# Patient Record
Sex: FEMALE | Race: WHITE | NOT HISPANIC OR LATINO | Employment: OTHER | URBAN - METROPOLITAN AREA
[De-identification: names, ages, dates, MRNs, and addresses within clinical notes are randomized per-mention and may not be internally consistent; named-entity substitution may affect disease eponyms.]

---

## 2017-03-03 ENCOUNTER — HOSPITAL ENCOUNTER (EMERGENCY)
Facility: MEDICAL CENTER | Age: 62
End: 2017-03-03
Attending: EMERGENCY MEDICINE
Payer: COMMERCIAL

## 2017-03-03 ENCOUNTER — OFFICE VISIT (OUTPATIENT)
Dept: URGENT CARE | Facility: CLINIC | Age: 62
End: 2017-03-03
Payer: COMMERCIAL

## 2017-03-03 ENCOUNTER — APPOINTMENT (OUTPATIENT)
Dept: RADIOLOGY | Facility: MEDICAL CENTER | Age: 62
End: 2017-03-03
Attending: EMERGENCY MEDICINE
Payer: COMMERCIAL

## 2017-03-03 VITALS
OXYGEN SATURATION: 94 % | RESPIRATION RATE: 10 BRPM | HEIGHT: 62 IN | DIASTOLIC BLOOD PRESSURE: 90 MMHG | HEART RATE: 94 BPM | SYSTOLIC BLOOD PRESSURE: 162 MMHG | TEMPERATURE: 98.6 F | WEIGHT: 144 LBS | BODY MASS INDEX: 26.5 KG/M2

## 2017-03-03 VITALS
TEMPERATURE: 98.2 F | HEART RATE: 70 BPM | WEIGHT: 143 LBS | SYSTOLIC BLOOD PRESSURE: 112 MMHG | DIASTOLIC BLOOD PRESSURE: 62 MMHG | OXYGEN SATURATION: 99 % | RESPIRATION RATE: 16 BRPM | BODY MASS INDEX: 26.15 KG/M2

## 2017-03-03 DIAGNOSIS — R07.89 CHEST TIGHTNESS: ICD-10-CM

## 2017-03-03 DIAGNOSIS — R06.09 DYSPNEA ON EFFORT: ICD-10-CM

## 2017-03-03 DIAGNOSIS — B33.24 VIRAL CARDIOMYOPATHY (HCC): ICD-10-CM

## 2017-03-03 DIAGNOSIS — J01.00 ACUTE MAXILLARY SINUSITIS, RECURRENCE NOT SPECIFIED: ICD-10-CM

## 2017-03-03 DIAGNOSIS — J40 BRONCHITIS: ICD-10-CM

## 2017-03-03 DIAGNOSIS — J06.9 VIRAL UPPER RESPIRATORY ILLNESS: ICD-10-CM

## 2017-03-03 LAB
ALBUMIN SERPL BCP-MCNC: 4.1 G/DL (ref 3.2–4.9)
ALBUMIN/GLOB SERPL: 1.1 G/DL
ALP SERPL-CCNC: 131 U/L (ref 30–99)
ALT SERPL-CCNC: 26 U/L (ref 2–50)
ANION GAP SERPL CALC-SCNC: 10 MMOL/L (ref 0–11.9)
AST SERPL-CCNC: 31 U/L (ref 12–45)
BASOPHILS # BLD AUTO: 0.7 % (ref 0–1.8)
BASOPHILS # BLD: 0.05 K/UL (ref 0–0.12)
BILIRUB SERPL-MCNC: 0.3 MG/DL (ref 0.1–1.5)
BNP SERPL-MCNC: 5 PG/ML (ref 0–100)
BUN SERPL-MCNC: 10 MG/DL (ref 8–22)
CALCIUM SERPL-MCNC: 8.6 MG/DL (ref 8.5–10.5)
CHLORIDE SERPL-SCNC: 100 MMOL/L (ref 96–112)
CO2 SERPL-SCNC: 22 MMOL/L (ref 20–33)
CREAT SERPL-MCNC: 0.99 MG/DL (ref 0.5–1.4)
EOSINOPHIL # BLD AUTO: 0.23 K/UL (ref 0–0.51)
EOSINOPHIL NFR BLD: 3.2 % (ref 0–6.9)
ERYTHROCYTE [DISTWIDTH] IN BLOOD BY AUTOMATED COUNT: 41.1 FL (ref 35.9–50)
GFR SERPL CREATININE-BSD FRML MDRD: 57 ML/MIN/1.73 M 2
GLOBULIN SER CALC-MCNC: 3.9 G/DL (ref 1.9–3.5)
GLUCOSE SERPL-MCNC: 98 MG/DL (ref 65–99)
HCT VFR BLD AUTO: 42.5 % (ref 37–47)
HGB BLD-MCNC: 14.3 G/DL (ref 12–16)
IMM GRANULOCYTES # BLD AUTO: 0.01 K/UL (ref 0–0.11)
IMM GRANULOCYTES NFR BLD AUTO: 0.1 % (ref 0–0.9)
LYMPHOCYTES # BLD AUTO: 1.4 K/UL (ref 1–4.8)
LYMPHOCYTES NFR BLD: 19.3 % (ref 22–41)
MCH RBC QN AUTO: 29.1 PG (ref 27–33)
MCHC RBC AUTO-ENTMCNC: 33.6 G/DL (ref 33.6–35)
MCV RBC AUTO: 86.4 FL (ref 81.4–97.8)
MONOCYTES # BLD AUTO: 0.77 K/UL (ref 0–0.85)
MONOCYTES NFR BLD AUTO: 10.6 % (ref 0–13.4)
NEUTROPHILS # BLD AUTO: 4.81 K/UL (ref 2–7.15)
NEUTROPHILS NFR BLD: 66.1 % (ref 44–72)
NRBC # BLD AUTO: 0 K/UL
NRBC BLD AUTO-RTO: 0 /100 WBC
PLATELET # BLD AUTO: 252 K/UL (ref 164–446)
PMV BLD AUTO: 10.9 FL (ref 9–12.9)
POTASSIUM SERPL-SCNC: 3.8 MMOL/L (ref 3.6–5.5)
PROT SERPL-MCNC: 8 G/DL (ref 6–8.2)
RBC # BLD AUTO: 4.92 M/UL (ref 4.2–5.4)
SODIUM SERPL-SCNC: 132 MMOL/L (ref 135–145)
TROPONIN I SERPL-MCNC: <0.01 NG/ML (ref 0–0.04)
WBC # BLD AUTO: 7.3 K/UL (ref 4.8–10.8)

## 2017-03-03 PROCEDURE — 36415 COLL VENOUS BLD VENIPUNCTURE: CPT

## 2017-03-03 PROCEDURE — 71010 DX-CHEST-LIMITED (1 VIEW): CPT

## 2017-03-03 PROCEDURE — 99204 OFFICE O/P NEW MOD 45 MIN: CPT | Performed by: FAMILY MEDICINE

## 2017-03-03 PROCEDURE — 87040 BLOOD CULTURE FOR BACTERIA: CPT

## 2017-03-03 PROCEDURE — 84484 ASSAY OF TROPONIN QUANT: CPT

## 2017-03-03 PROCEDURE — 93000 ELECTROCARDIOGRAM COMPLETE: CPT | Performed by: FAMILY MEDICINE

## 2017-03-03 PROCEDURE — 83880 ASSAY OF NATRIURETIC PEPTIDE: CPT

## 2017-03-03 PROCEDURE — 85025 COMPLETE CBC W/AUTO DIFF WBC: CPT

## 2017-03-03 PROCEDURE — 99284 EMERGENCY DEPT VISIT MOD MDM: CPT

## 2017-03-03 PROCEDURE — 80053 COMPREHEN METABOLIC PANEL: CPT

## 2017-03-03 RX ORDER — TRIAMCINOLONE ACETONIDE 55 UG/1
2 SPRAY, METERED NASAL DAILY
Qty: 1 BOTTLE | Refills: 1 | Status: SHIPPED | OUTPATIENT
Start: 2017-03-03 | End: 2020-12-24

## 2017-03-03 RX ORDER — AMOXICILLIN 875 MG/1
875 TABLET, COATED ORAL 2 TIMES DAILY
Qty: 28 TAB | Refills: 0 | Status: SHIPPED | OUTPATIENT
Start: 2017-03-03 | End: 2017-03-17

## 2017-03-03 RX ORDER — IBUPROFEN 200 MG
3 CAPSULE ORAL EVERY EVENING
COMMUNITY
End: 2020-12-24

## 2017-03-03 RX ORDER — ASPIRIN 81 MG/1
162 TABLET, CHEWABLE ORAL ONCE
Status: COMPLETED | OUTPATIENT
Start: 2017-03-03 | End: 2017-03-03

## 2017-03-03 RX ORDER — BENZONATATE 200 MG/1
200 CAPSULE ORAL 3 TIMES DAILY PRN
Qty: 30 CAP | Refills: 1 | Status: SHIPPED | OUTPATIENT
Start: 2017-03-03 | End: 2020-12-24

## 2017-03-03 RX ORDER — ALBUTEROL SULFATE 90 UG/1
2 AEROSOL, METERED RESPIRATORY (INHALATION)
Qty: 1 INHALER | Refills: 2 | Status: SHIPPED | OUTPATIENT
Start: 2017-03-03 | End: 2023-09-24

## 2017-03-03 RX ADMIN — ASPIRIN 162 MG: 81 TABLET, CHEWABLE ORAL at 15:04

## 2017-03-03 ASSESSMENT — ENCOUNTER SYMPTOMS
VOMITING: 0
FEVER: 1
SPUTUM PRODUCTION: 1
CHILLS: 1
FALLS: 0
WHEEZING: 0
HEMOPTYSIS: 0
SORE THROAT: 1
BLURRED VISION: 0
PHOTOPHOBIA: 1
MYALGIAS: 1
NAUSEA: 0
NAUSEA: 0
RHINORRHEA: 1
BACK PAIN: 0
COUGH: 1
FEVER: 0
SHORTNESS OF BREATH: 1
DIZZINESS: 0
CHILLS: 1
VOMITING: 0
SHORTNESS OF BREATH: 1
NECK PAIN: 0
COUGH: 1

## 2017-03-03 ASSESSMENT — PAIN SCALES - GENERAL: PAINLEVEL_OUTOF10: 4

## 2017-03-03 NOTE — PROGRESS NOTES
Subjective:      Lisa Yang is a 61 y.o. female who presents with Cough and Rash            Cough  This is a new problem. The current episode started in the past 7 days (5 days). The problem has been gradually worsening. The problem occurs constantly. The cough is productive of sputum (yellow). Associated symptoms include chest pain, chills, ear congestion, a fever, myalgias, nasal congestion, postnasal drip, a rash, rhinorrhea, a sore throat and shortness of breath. Pertinent negatives include no ear pain, hemoptysis or wheezing. Associated symptoms comments: Tactile fever. Exacerbated by: deep breathingdaytime and hot showers. Treatments tried: theraflu, nyquil. The treatment provided mild relief. Her past medical history is significant for asthma, bronchitis, environmental allergies and pneumonia.   Rash  Associated symptoms include coughing, a fever, rhinorrhea, shortness of breath and a sore throat. Pertinent negatives include no vomiting. Her past medical history is significant for asthma.       Review of Systems   Constitutional: Positive for fever and chills.   HENT: Positive for postnasal drip, rhinorrhea and sore throat. Negative for ear pain.    Eyes: Negative for blurred vision.   Respiratory: Positive for cough and shortness of breath. Negative for hemoptysis and wheezing.    Cardiovascular: Positive for chest pain.   Gastrointestinal: Negative for nausea and vomiting.   Musculoskeletal: Positive for myalgias.   Skin: Positive for rash.   Neurological: Negative for dizziness.   Endo/Heme/Allergies: Positive for environmental allergies.     PMH:  has a past medical history of Papillary carcinoma of thyroid (CMS-HCC) (1998); Hypothyroidism, postsurgical (1998); papillary carcinoma thyroid (Mar 1998); papillary carcinoma thyroid (Nov 1998); papillary carcinoma thyroid (1999); papillary carcinoma thyroid (2003); papillary carcinoma thyroid (2005); S/P hysterectomy with oophorectomy; and S/P  cholecystectomy (2006).  MEDS:   Current outpatient prescriptions:   •  ARMOUR THYROID 30 MG Tab, Take 4 tablets every 48  hours. Then take 3 tablets  every 48 hours., Disp: 360 Tab, Rfl: 3  •  calcitRIOL (ROCALTROL) 0.25 MCG Cap, Take 2 Caps by mouth every day., Disp: 180 Cap, Rfl: 3  •  estradiol (VIVELLE DOT) 0.05 MG/24HR PATCH BIWEEKLY, Apply 1 Patch to skin as directed every 7 days., Disp: , Rfl:   ALLERGIES:   Allergies   Allergen Reactions   • Codeine      SURGHX:   Past Surgical History   Procedure Laterality Date   • Thyroidectomy     • Appendectomy     • Tubal coagulation laparoscopic bilateral     • Abdominal hysterectomy total     • Cholecystectomy       SOCHX:  reports that she has never smoked. She has never used smokeless tobacco. She reports that she drinks alcohol. She reports that she does not use illicit drugs.  FH: Family history was reviewed, no pertinent findings to report      Objective:     /62 mmHg  Pulse 70  Temp(Src) 36.8 °C (98.2 °F)  Resp 16  Wt 64.864 kg (143 lb)  SpO2 99%     Physical Exam   Constitutional: She appears well-developed.   HENT:   Head: Normocephalic.   Right Ear: External ear normal.   Left Ear: External ear normal.   Mouth/Throat: Oropharyngeal exudate present.   Nasal congestion   Eyes: Pupils are equal, round, and reactive to light. Right eye exhibits no discharge. Left eye exhibits no discharge.   Neck: Neck supple. No thyromegaly present.   Cardiovascular: Normal rate.  Exam reveals no friction rub.    No murmur heard.  Pulmonary/Chest: Effort normal. No respiratory distress. She has no wheezes.   Abdominal: Soft. She exhibits no distension. There is no tenderness. There is no guarding.   Lymphadenopathy:     She has no cervical adenopathy.   Neurological: She is alert.   Skin: Skin is warm and dry. No erythema.   Psychiatric: She has a normal mood and affect. Her behavior is normal.               Assessment/Plan:     1. Dyspnea on effort  EKG   2. Viral  cardiomyopathy (CMS-HCC)     3. Viral upper respiratory illness     4. Chest tightness  aspirin (ASA) chewable tab 162 mg     Patient has viral URI symptoms  The concern is that she has had some shortness of breath and chest tightness  She has a history of asthma, but she is stating that her symptoms don't seem asthmatic in nature and her lungs sound clear  She was having dyspnea on exertion with routine activity  Recommended transfer to the emergency room  Originally planned on going by private vehicle to [facility], but upon further discussion she agreed to go by ambulance to [facility]    2 baby aspirin provided in the office  [facility] called for transport Called [facility] and spoke with on call MD for report, accepted transfer

## 2017-03-03 NOTE — ED NOTES
Pt arrived via ems from , per ems pt started  Noticed a rash on chest 2 weeks ago along with a sinus infection. Sinus infection went away after 2 days but started having chest congestion, coughing, body aches for 5 days. U/a pt sitting up caox4 speaking in full sentences no distress, maintaining patent airway. +sob, +chest pain with inspiration, no abd pain.

## 2017-03-03 NOTE — MR AVS SNAPSHOT
Lisa Yang   3/3/2017 1:45 PM   Office Visit   MRN: [id number]    Department:  [facility]   Dept Phone:  [phone]    Description:  Female : 1955   Provider:  Robert Patten M.D.           Reason for Visit     Cough productive cough, yellow phlem, congestion x 5 days    Rash red raised rash on chest x 2 weeks      Allergies as of 3/3/2017     Allergen Noted Reactions    Codeine 2010         You were diagnosed with     Dyspnea on effort   [142978]       Viral cardiomyopathy (CMS-HCC)   [614306]       Viral upper respiratory illness   [982728]       Chest tightness   [579554]         Vital Signs     Blood Pressure Pulse Temperature Respirations Weight Oxygen Saturation    112/62 mmHg 70 36.8 °C (98.2 °F) 16 64.864 kg (143 lb) 99%    Smoking Status                   Never Smoker            Basic Information     Date Of Birth Sex Race Ethnicity Preferred Language    1955 Female White Non- English      Problem List              ICD-10-CM Priority Class Noted - Resolved    Papillary carcinoma of thyroid (CMS-HCC) C73   2010 - Present    Hypocalcemia E83.51   Unknown - Present    Hypothyroidism, postsurgical E89.0   2015 - Present      Health Maintenance        Date Due Completion Dates    IMM DTaP/Tdap/Td Vaccine (1 - Tdap) 1975 ---    PAP SMEAR 1977 ---    MAMMOGRAM 1996 ---    COLONOSCOPY 2006 ---    IMM INFLUENZA (1) 2016 ---    IMM ZOSTER VACCINE 2016 ---            Current Immunizations     No immunizations on file.      Below and/or attached are the medications your provider expects you to take. Review all of your home medications and newly ordered medications with your provider and/or pharmacist. Follow medication instructions as directed by your provider and/or pharmacist. Please keep your medication list with you and share with your provider. Update the information when medications are discontinued, doses are changed, or new  medications (including over-the-counter products) are added; and carry medication information at all times in the event of emergency situations     Allergies:  CODEINE - (reactions not documented)               Medications  Valid as of: March 03, 2017 -  3:05 PM    Generic Name Brand Name Tablet Size Instructions for use    Calcitriol (Cap) ROCALTROL 0.25 MCG Take 2 Caps by mouth every day.        Estradiol (PATCH BIWEEKLY) VIVELLE DOT 0.05 MG/24HR Apply 1 Patch to skin as directed every 7 days.        Thyroid (Tab) ARMOUR THYROID 30 MG Take 4 tablets every 48  hours. Then take 3 tablets  every 48 hours.        .                 Medicines prescribed today were sent to:     [facility][id number] - [facility] [address] [address]    Phone: [phone] Fax: [phone]    Open 24 Hours?: No    [facility] - [address] [facility] [address]    Phone: [phone] Fax: [phone]    Open 24 Hours?: No    [facility] #[id number] - [place], [place] - [address]    [address] [facility] [address]    Phone: [phone] Fax: [phone]    Open 24 Hours?: No      Medication refill instructions:       If your prescription bottle indicates you have medication refills left, it is not necessary to call your provider’s office. Please contact your pharmacy and they will refill your medication.    If your prescription bottle indicates you do not have any refills left, you may request refills at any time through one of the following ways: The online iWitness system (except Urgent Care), by calling your provider’s office, or by asking your pharmacy to contact your provider’s office with a refill request. Medication refills are processed only during regular business hours and may not be available until the next business day. Your provider may request additional information or to have a follow-up visit with you prior to  refilling your medication.   *Please Note: Medication refills are assigned a new Rx number when refilled electronically. Your pharmacy may indicate that no refills were authorized even though a new prescription for the same medication is available at the pharmacy. Please request the medicine by name with the pharmacy before contacting your provider for a refill.           Order Mapper Access Code: [email or web address]  Expires: 4/2/2017  3:05 PM    Order Mapper  A secure, online tool to manage your health information     [employer]’s Order Mapper® is a secure, online tool that connects you to your personalized health information from the privacy of your home -- day or night - making it very easy for you to manage your healthcare. Once the activation process is completed, you can even access your medical information using the Order Mapper teddy, which is available for free in the Apple Teddy store or Google Play store.     Order Mapper provides the following levels of access (as shown below):   My Chart Features   [facility] Doctor [facility]  Specialists [facility]  Urgent  Care Non-[facility]  Primary Care  Doctor   Email your healthcare team securely and privately 24/7 X X X    Manage appointments: schedule your next appointment; view details of past/upcoming appointments X      Request prescription refills. X      View recent personal medical records, including lab and immunizations X X X X   View health record, including health history, allergies, medications X X X X   Read reports about your outpatient visits, procedures, consult and ER notes X X X X   See your discharge summary, which is a recap of your hospital and/or ER visit that includes your diagnosis, lab results, and care plan. X X       How to register for Order Mapper:  1. Go to  [email or web address].  2. Click on the Sign Up Now box, which takes you to the New Member Sign Up page. You will need to provide the following information:  a. Enter your Order Mapper Access Code exactly as it  appears at the top of this page. (You will not need to use this code after you’ve completed the sign-up process. If you do not sign up before the expiration date, you must request a new code.)   b. Enter your date of birth.   c. Enter your home email address.   d. Click Submit, and follow the next screen’s instructions.  3. Create a [employer] ID. This will be your [employer] login ID and cannot be changed, so think of one that is secure and easy to remember.  4. Create a [employer] password. You can change your password at any time.  5. Enter your Password Reset Question and Answer. This can be used at a later time if you forget your password.   6. Enter your e-mail address. This allows you to receive e-mail notifications when new information is available in [employer].  7. Click Sign Up. You can now view your health information.    For assistance activating your [employer] account, call [phone]

## 2017-03-03 NOTE — ED AVS SNAPSHOT
3/3/2017          Lisa Yang  [address] [facility] [place] [address]    Dear Lisa:    [facility] wants to ensure your discharge home is safe and you or your loved ones have had all your questions answered regarding your care after you leave the hospital.    You may receive a telephone call within two days of your discharge.  This call is to make certain you understand your discharge instructions as well as ensure we provided you with the best care possible during your stay with us.     The call will only last approximately 3-5 minutes and will be done by a nurse.    Once again, we want to ensure your discharge home is safe and that you have a clear understanding of any next steps in your care.  If you have any questions or concerns, please do not hesitate to contact us, we are here for you.  Thank you for choosing [facility] for your healthcare needs.    Sincerely,    Navid Walker    [facility]

## 2017-03-03 NOTE — ED AVS SNAPSHOT
Cavis microcaps Access Code: [email or web address]  Expires: 4/2/2017  3:05 PM    Your email address is not on file at [facility].  Email Addresses are required for you to sign up for Cavis microcaps, please contact [phone] to verify your personal information and to provide your email address prior to attempting to register for Cavis microcaps.    Lisa Yang  [address] [place] [address]    Cavis microcaps  A secure, online tool to manage your health information     [employer]’s Cavis microcaps® is a secure, online tool that connects you to your personalized health information from the privacy of your home -- day or night - making it very easy for you to manage your healthcare. Once the activation process is completed, you can even access your medical information using the Cavis microcaps teddy, which is available for free in the Apple Teddy store or Google Play store.     To learn more about Cavis microcaps, visit [email or web address]    There are two levels of access available (as shown below):   My Chart Features  [facility] Primary Care Doctor [facility]  Specialists [facility]  Urgent  Care Non-[facility] Primary Care Doctor   Email your healthcare team securely and privately 24/7 X X X    Manage appointments: schedule your next appointment; view details of past/upcoming appointments X      Request prescription refills. X      View recent personal medical records, including lab and immunizations X X X X   View health record, including health history, allergies, medications X X X X   Read reports about your outpatient visits, procedures, consult and ER notes X X X X   See your discharge summary, which is a recap of your hospital and/or ER visit that includes your diagnosis, lab results, and care plan X X  X     How to register for Cavis microcaps:  Once your e-mail address has been verified, follow the following steps to sign up for Cavis microcaps.     1. Go to  [email or web address]  2. Click on the Sign Up Now box, which takes you to the New Member Sign Up page. You will  need to provide the following information:  a. Enter your C[employer]ay Access Code exactly as it appears at the top of this page. (You will not need to use this code after you’ve completed the sign-up process. If you do not sign up before the expiration date, you must request a new code.)   b. Enter your date of birth.   c. Enter your home email address.   d. Click Submit, and follow the next screen’s instructions.  3. Create a [employer] ID. This will be your Cinsay login ID and cannot be changed, so think of one that is secure and easy to remember.  4. Create a Cinsay password. You can change your password at any time.  5. Enter your Password Reset Question and Answer. This can be used at a later time if you forget your password.   6. Enter your e-mail address. This allows you to receive e-mail notifications when new information is available in Cinsay.  7. Click Sign Up. You can now view your health information.    For assistance activating your C[employer] account, call [phone]

## 2017-03-03 NOTE — ED AVS SNAPSHOT
Home Care Instructions                                                                                                                Lisa Yang   MRN: [id number]    Department:  [facility], Emergency Dept   Date of Visit:  3/3/2017            [facility], Emergency Dept    [address]    [place] [address]    Phone:  [phone]      You were seen by     Miguel Ivan M.D.      Your Diagnosis Was     Acute maxillary sinusitis, recurrence not specified     J01.00       Medication Information     Review all of your home medications and newly ordered medications with your primary doctor and/or pharmacist as soon as possible. Follow medication instructions as directed by your doctor and/or pharmacist.     Please keep your complete medication list with you and share with your physician. Update the information when medications are discontinued, doses are changed, or new medications (including over-the-counter products) are added; and carry medication information at all times in the event of emergency situations.               Medication List      START taking these medications        Instructions    albuterol 108 (90 BASE) MCG/ACT Aers inhalation aerosol    Inhale 2 Puffs by mouth every 2 hours as needed for Shortness of Breath (or cough).   Dose:  2 Puff       amoxicillin 875 MG tablet   Commonly known as:  AMOXIL    Take 1 Tab by mouth 2 times a day for 14 days.   Dose:  875 mg       benzonatate 200 MG capsule   Commonly known as:  TESSALON    Take 1 Cap by mouth 3 times a day as needed for Cough.   Dose:  200 mg       triamcinolone 55 MCG/ACT nasal inhaler   Commonly known as:  NASACORT    Spray 2 Sprays in nose every day.   Dose:  2 Spray         ASK your doctor about these medications        Instructions    ARMOUR THYROID 30 MG Tabs   Generic drug:  thyroid    Take 4 tablets every 48  hours. Then take 3 tablets  every 48 hours.       calcitRIOL 0.25 MCG Caps      Commonly known as:  ROCALTROL    Take 2 Caps by mouth every day.   Dose:  0.5 mcg       Calcium 600 MG Tabs    Take 3 Tabs by mouth every evening.   Dose:  3 Tab       estradiol 0.05 MG/24HR Pttw   Commonly known as:  CHELE CALDWELL    Apply 1 Patch to skin as directed 2X A WEEK. Tuesday/Saturday   Dose:  1 Patch               Procedures and tests performed during your visit     Procedure/Test Number of Times Performed    BLOOD CULTURE 2    BTYPE NATRIURETIC PEPTIDE 1    CARDIAC MONITORING 1    CBC WITH DIFFERENTIAL 1    COMP METABOLIC PANEL 1    DX-CHEST-LIMITED (1 VIEW) 1    ESTIMATED GFR 1    O2 Protocol 1    SALINE LOCK 1    TROPONIN 1        Discharge Instructions       Bronchitis    Return to the ED with any new or worsening symptoms. Follow up with Cardiology.  Please follow up with a primary physician for blood pressure management, diabetic screening, and all other preventive health concerns.   Bronchitis is the body's way of reacting to injury and/or infection (inflammation) of the bronchi. Bronchi are the air tubes that extend from the windpipe into the lungs. If the inflammation becomes severe, it may cause shortness of breath.  CAUSES   Inflammation may be caused by:  · A virus.  · Germs (bacteria).  · Dust.  · Allergens.  · Pollutants and many other irritants.  The cells lining the bronchial tree are covered with tiny hairs (cilia). These constantly beat upward, away from the lungs, toward the mouth. This keeps the lungs free of pollutants. When these cells become too irritated and are unable to do their job, mucus begins to develop. This causes the characteristic cough of bronchitis. The cough clears the lungs when the cilia are unable to do their job. Without either of these protective mechanisms, the mucus would settle in the lungs. Then you would develop pneumonia.  Smoking is a common cause of bronchitis and can contribute to pneumonia. Stopping this habit is the single most important thing you can  do to help yourself.  TREATMENT   · Your caregiver may prescribe an antibiotic if the cough is caused by bacteria. Also, medicines that open up your airways make it easier to breathe. Your caregiver may also recommend or prescribe an expectorant. It will loosen the mucus to be coughed up. Only take over-the-counter or prescription medicines for pain, discomfort, or fever as directed by your caregiver.  · Removing whatever causes the problem (smoking, for example) is critical to preventing the problem from getting worse.  · Cough suppressants may be prescribed for relief of cough symptoms.  · Inhaled medicines may be prescribed to help with symptoms now and to help prevent problems from returning.  · For those with recurrent (chronic) bronchitis, there may be a need for steroid medicines.  SEEK IMMEDIATE MEDICAL CARE IF:   · During treatment, you develop more pus-like mucus (purulent sputum).  · You have a fever.  · Your baby is older than 3 months with a rectal temperature of 102° F (38.9° C) or higher.  · Your baby is 3 months old or younger with a rectal temperature of 100.4° F (38° C) or higher.  · You become progressively more ill.  · You have increased difficulty breathing, wheezing, or shortness of breath.  It is necessary to seek immediate medical care if you are elderly or sick from any other disease.  MAKE SURE YOU:   · Understand these instructions.  · Will watch your condition.  · Will get help right away if you are not doing well or get worse.  Document Released: 12/18/2006 Document Revised: 03/11/2013 Document Reviewed: 10/27/2009  ExitCare® Patient Information ©2014 [employer].      Sinusitis, Adult  Sinusitis is redness, soreness, and inflammation of the paranasal sinuses. Paranasal sinuses are air pockets within the bones of your face. They are located beneath your eyes, in the middle of your forehead, and above your eyes. In healthy paranasal sinuses, mucus is able to drain out, and air is able  to circulate through them by way of your nose. However, when your paranasal sinuses are inflamed, mucus and air can become trapped. This can allow bacteria and other germs to grow and cause infection.  Sinusitis can develop quickly and last only a short time (acute) or continue over a long period (chronic). Sinusitis that lasts for more than 12 weeks is considered chronic.  CAUSES  Causes of sinusitis include:  · Allergies.  · Structural abnormalities, such as displacement of the cartilage that separates your nostrils (deviated septum), which can decrease the air flow through your nose and sinuses and affect sinus drainage.  · Functional abnormalities, such as when the small hairs (cilia) that line your sinuses and help remove mucus do not work properly or are not present.  SIGNS AND SYMPTOMS  Symptoms of acute and chronic sinusitis are the same. The primary symptoms are pain and pressure around the affected sinuses. Other symptoms include:  · Upper toothache.  · Earache.  · Headache.  · Bad breath.  · Decreased sense of smell and taste.  · A cough, which worsens when you are lying flat.  · Fatigue.  · Fever.  · Thick drainage from your nose, which often is green and may contain pus (purulent).  · Swelling and warmth over the affected sinuses.  DIAGNOSIS  Your health care provider will perform a physical exam. During your exam, your health care provider may perform any of the following to help determine if you have acute sinusitis or chronic sinusitis:  · Look in your nose for signs of abnormal growths in your nostrils (nasal polyps).  · Tap over the affected sinus to check for signs of infection.  · View the inside of your sinuses using an imaging device that has a light attached (endoscope).  If your health care provider suspects that you have chronic sinusitis, one or more of the following tests may be recommended:  · Allergy tests.  · Nasal culture. A sample of mucus is taken from your nose, sent to a lab, and  screened for bacteria.  · Nasal cytology. A sample of mucus is taken from your nose and examined by your health care provider to determine if your sinusitis is related to an allergy.  TREATMENT  Most cases of acute sinusitis are related to a viral infection and will resolve on their own within 10 days. Sometimes, medicines are prescribed to help relieve symptoms of both acute and chronic sinusitis. These may include pain medicines, decongestants, nasal steroid sprays, or saline sprays.  However, for sinusitis related to a bacterial infection, your health care provider will prescribe antibiotic medicines. These are medicines that will help kill the bacteria causing the infection.  Rarely, sinusitis is caused by a fungal infection. In these cases, your health care provider will prescribe antifungal medicine.  For some cases of chronic sinusitis, surgery is needed. Generally, these are cases in which sinusitis recurs more than 3 times per year, despite other treatments.  HOME CARE INSTRUCTIONS  · Drink plenty of water. Water helps thin the mucus so your sinuses can drain more easily.  · Use a humidifier.  · Inhale steam 3-4 times a day (for example, sit in the bathroom with the shower running).  · Apply a warm, moist washcloth to your face 3-4 times a day, or as directed by your health care provider.  · Use saline nasal sprays to help moisten and clean your sinuses.  · Take medicines only as directed by your health care provider.  · If you were prescribed either an antibiotic or antifungal medicine, finish it all even if you start to feel better.  SEEK IMMEDIATE MEDICAL CARE IF:  · You have increasing pain or severe headaches.  · You have nausea, vomiting, or drowsiness.  · You have swelling around your face.  · You have vision problems.  · You have a stiff neck.  · You have difficulty breathing.     This information is not intended to replace advice given to you by your health care provider. Make sure you discuss any  questions you have with your health care provider.     Document Released: 12/18/2006 Document Revised: 01/08/2016 Document Reviewed: 01/01/2013  Elsevier Interactive Patient Education ©2016 [employer]            Patient Information     Patient Information    Following emergency treatment: all patient requiring follow-up care must return either to a private physician or a clinic if your condition worsens before you are able to obtain further medical attention, please return to the emergency room.     Billing Information    At [facility], we work to make the billing process streamlined for our patients.  Our Representatives are here to answer any questions you may have regarding your hospital bill.  If you have insurance coverage and have supplied your insurance information to us, we will submit a claim to your insurer on your behalf.  Should you have any questions regarding your bill, we can be reached online or by phone as follows:  Online: You are able pay your bills online or live chat with our representatives about any billing questions you may have. We are here to help Monday - Friday from 8:00am to 7:30pm and 9:00am - 12:00pm on Saturdays.  Please visit [email or web address]  for more information.   Phone:  [phone] or [phone]    Please note that your emergency physician, surgeon, pathologist, radiologist, anesthesiologist, and other specialists are not employed by [facility] and will therefore bill separately for their services.  Please contact them directly for any questions concerning their bills at the numbers below:     Emergency Physician Services:  [phone]  [facility]:  [phone]  [facility]:  [phone]  [facility]:  [phone]    1. Your final bill may vary from the amount quoted upon discharge if all procedures are not complete at that time, or if your doctor has additional procedures of which  we are not aware. You will receive an additional bill if you return to the Emergency Department at [facility] for suture removal regardless of the facility of which the sutures were placed.     2. Please arrange for settlement of this account at the emergency registration.    3. All self-pay accounts are due in full at the time of treatment.  If you are unable to meet this obligation then payment is expected within 4-5 days.     4. If you have had radiology studies (CT, X-ray, Ultrasound, MRI), you have received a preliminary result during your emergency department visit. Please contact the radiology department [phone] to receive a copy of your final result. Please discuss the Final result with your primary physician or with the follow up physician provided.     Crisis Hotline:  [place] Crisis Hotline:  2-741-BKSOLYL or 1-985.638.2438  [place] Crisis Hotline:    1-865.965.2055 or 584-878-9295         ED Discharge Follow Up Questions    1. In order to provide you with very good care, we would like to follow up with a phone call in the next few days.  May we have your permission to contact you?     YES /  NO    2. What is the best phone number to call you? (       )_____-__________    3. What is the best time to call you?      Morning  /  Afternoon  /  Evening                   Patient Signature:  ____________________________________________________________    Date:  ____________________________________________________________

## 2017-03-04 ENCOUNTER — PATIENT OUTREACH (OUTPATIENT)
Dept: HEALTH INFORMATION MANAGEMENT | Facility: OTHER | Age: 62
End: 2017-03-04

## 2017-03-04 NOTE — DISCHARGE INSTRUCTIONS
Bronchitis    Return to the ED with any new or worsening symptoms. Follow up with Cardiology.  Please follow up with a primary physician for blood pressure management, diabetic screening, and all other preventive health concerns.   Bronchitis is the body's way of reacting to injury and/or infection (inflammation) of the bronchi. Bronchi are the air tubes that extend from the windpipe into the lungs. If the inflammation becomes severe, it may cause shortness of breath.  CAUSES   Inflammation may be caused by:  · A virus.  · Germs (bacteria).  · Dust.  · Allergens.  · Pollutants and many other irritants.  The cells lining the bronchial tree are covered with tiny hairs (cilia). These constantly beat upward, away from the lungs, toward the mouth. This keeps the lungs free of pollutants. When these cells become too irritated and are unable to do their job, mucus begins to develop. This causes the characteristic cough of bronchitis. The cough clears the lungs when the cilia are unable to do their job. Without either of these protective mechanisms, the mucus would settle in the lungs. Then you would develop pneumonia.  Smoking is a common cause of bronchitis and can contribute to pneumonia. Stopping this habit is the single most important thing you can do to help yourself.  TREATMENT   · Your caregiver may prescribe an antibiotic if the cough is caused by bacteria. Also, medicines that open up your airways make it easier to breathe. Your caregiver may also recommend or prescribe an expectorant. It will loosen the mucus to be coughed up. Only take over-the-counter or prescription medicines for pain, discomfort, or fever as directed by your caregiver.  · Removing whatever causes the problem (smoking, for example) is critical to preventing the problem from getting worse.  · Cough suppressants may be prescribed for relief of cough symptoms.  · Inhaled medicines may be prescribed to help with symptoms now and to help prevent  problems from returning.  · For those with recurrent (chronic) bronchitis, there may be a need for steroid medicines.  SEEK IMMEDIATE MEDICAL CARE IF:   · During treatment, you develop more pus-like mucus (purulent sputum).  · You have a fever.  · Your baby is older than 3 months with a rectal temperature of 102° F (38.9° C) or higher.  · Your baby is 3 months old or younger with a rectal temperature of 100.4° F (38° C) or higher.  · You become progressively more ill.  · You have increased difficulty breathing, wheezing, or shortness of breath.  It is necessary to seek immediate medical care if you are elderly or sick from any other disease.  MAKE SURE YOU:   · Understand these instructions.  · Will watch your condition.  · Will get help right away if you are not doing well or get worse.  Document Released: 12/18/2006 Document Revised: 03/11/2013 Document Reviewed: 10/27/2009  [employer]® Patient Information ©2014 [employer].      Sinusitis, Adult  Sinusitis is redness, soreness, and inflammation of the paranasal sinuses. Paranasal sinuses are air pockets within the bones of your face. They are located beneath your eyes, in the middle of your forehead, and above your eyes. In healthy paranasal sinuses, mucus is able to drain out, and air is able to circulate through them by way of your nose. However, when your paranasal sinuses are inflamed, mucus and air can become trapped. This can allow bacteria and other germs to grow and cause infection.  Sinusitis can develop quickly and last only a short time (acute) or continue over a long period (chronic). Sinusitis that lasts for more than 12 weeks is considered chronic.  CAUSES  Causes of sinusitis include:  · Allergies.  · Structural abnormalities, such as displacement of the cartilage that separates your nostrils (deviated septum), which can decrease the air flow through your nose and sinuses and affect sinus drainage.  · Functional abnormalities, such as when the  small hairs (cilia) that line your sinuses and help remove mucus do not work properly or are not present.  SIGNS AND SYMPTOMS  Symptoms of acute and chronic sinusitis are the same. The primary symptoms are pain and pressure around the affected sinuses. Other symptoms include:  · Upper toothache.  · Earache.  · Headache.  · Bad breath.  · Decreased sense of smell and taste.  · A cough, which worsens when you are lying flat.  · Fatigue.  · Fever.  · Thick drainage from your nose, which often is green and may contain pus (purulent).  · Swelling and warmth over the affected sinuses.  DIAGNOSIS  Your health care provider will perform a physical exam. During your exam, your health care provider may perform any of the following to help determine if you have acute sinusitis or chronic sinusitis:  · Look in your nose for signs of abnormal growths in your nostrils (nasal polyps).  · Tap over the affected sinus to check for signs of infection.  · View the inside of your sinuses using an imaging device that has a light attached (endoscope).  If your health care provider suspects that you have chronic sinusitis, one or more of the following tests may be recommended:  · Allergy tests.  · Nasal culture. A sample of mucus is taken from your nose, sent to a lab, and screened for bacteria.  · Nasal cytology. A sample of mucus is taken from your nose and examined by your health care provider to determine if your sinusitis is related to an allergy.  TREATMENT  Most cases of acute sinusitis are related to a viral infection and will resolve on their own within 10 days. Sometimes, medicines are prescribed to help relieve symptoms of both acute and chronic sinusitis. These may include pain medicines, decongestants, nasal steroid sprays, or saline sprays.  However, for sinusitis related to a bacterial infection, your health care provider will prescribe antibiotic medicines. These are medicines that will help kill the bacteria causing the  infection.  Rarely, sinusitis is caused by a fungal infection. In these cases, your health care provider will prescribe antifungal medicine.  For some cases of chronic sinusitis, surgery is needed. Generally, these are cases in which sinusitis recurs more than 3 times per year, despite other treatments.  HOME CARE INSTRUCTIONS  · Drink plenty of water. Water helps thin the mucus so your sinuses can drain more easily.  · Use a humidifier.  · Inhale steam 3-4 times a day (for example, sit in the bathroom with the shower running).  · Apply a warm, moist washcloth to your face 3-4 times a day, or as directed by your health care provider.  · Use saline nasal sprays to help moisten and clean your sinuses.  · Take medicines only as directed by your health care provider.  · If you were prescribed either an antibiotic or antifungal medicine, finish it all even if you start to feel better.  SEEK IMMEDIATE MEDICAL CARE IF:  · You have increasing pain or severe headaches.  · You have nausea, vomiting, or drowsiness.  · You have swelling around your face.  · You have vision problems.  · You have a stiff neck.  · You have difficulty breathing.     This information is not intended to replace advice given to you by your health care provider. Make sure you discuss any questions you have with your health care provider.     Document Released: 12/18/2006 Document Revised: 01/08/2016 Document Reviewed: 01/01/2013  [employer] Interactive Patient Education ©2016 [employer]

## 2017-03-04 NOTE — ED PROVIDER NOTES
ED Provider Note    Scribed for Miguel Ivan M.D. by Feliz Mckeon. 3/3/2017, 4:49 PM.    Primary care provider: Joel Muñoz M.D.  Means of arrival: Ambulance  History obtained from: Patient  History limited by: None    CHIEF COMPLAINT  Chief Complaint   Patient presents with   • Shortness of Breath   • Chest Pain     HPI  Lisa Yang is a 61 y.o. female who presents to the Emergency Department complaining of persistent and worsening cough, onset 5 days. The patient reports that she was asymptomatic prior to the onset of her symptoms. Per patient when she coughs she develops severe chest pain. She reports that her chest pain and pressure persists when she is not coughing. Her chest pain began three days ago and is waxing and waning in nature. The patient's pain is described as a burning sensation.  Patient endorses yellow sputum production with her cough. She fears she has a sinus infection at this time since she has facial pain and green nasal drainage. Associated symptoms include chills and photophobia. She has had no alleviating factors for her symptoms. The patient was seen at Urgent Care today and was sent to the ED for further evaluation of an abnormal EKG.     REVIEW OF SYSTEMS  Review of Systems   Constitutional: Positive for chills. Negative for fever.   HENT: Positive for congestion (with facial pain).    Eyes: Positive for photophobia.   Respiratory: Positive for cough, sputum production and shortness of breath.    Cardiovascular: Positive for chest pain (with cough and with inspiration).   Gastrointestinal: Negative for nausea and vomiting.   Musculoskeletal: Negative for back pain, falls and neck pain.   Skin: Positive for rash.   All other systems reviewed and are negative.  C.     PAST MEDICAL HISTORY   has a past medical history of Papillary carcinoma of thyroid (CMS-HCC) (1998); Hypothyroidism, postsurgical (1998); papillary carcinoma thyroid (Mar 1998); papillary carcinoma thyroid (Nov  "1998); papillary carcinoma thyroid (1999); papillary carcinoma thyroid (2003); papillary carcinoma thyroid (2005); S/P hysterectomy with oophorectomy; and S/P cholecystectomy (2006).    SURGICAL HISTORY   has past surgical history that includes thyroidectomy; appendectomy; tubal coagulation laparoscopic bilateral; abdominal hysterectomy total; and cholecystectomy.    SOCIAL HISTORY  Social History   Substance Use Topics   • Smoking status: Never Smoker    • Smokeless tobacco: Never Used   • Alcohol Use: Yes      Comment: occosional 2 drinks      History   Drug Use No     FAMILY HISTORY  No pertinent past medical history      CURRENT MEDICATIONS  Home Medications     Reviewed by Wai Gibbons (Pharmacy Tech) on 03/03/17 at 1648  Med List Status: Complete    Medication Last Dose Status    ARMOUR THYROID 30 MG Tab 3/3/2017 Active    calcitRIOL (ROCALTROL) 0.25 MCG Cap 3/2/2017 Active    Calcium 600 MG Tab 3/2/2017 Active    estradiol (VIVELLE DOT) 0.05 MG/24HR PATCH BIWEEKLY >week Active                ALLERGIES  Allergies   Allergen Reactions   • Codeine      PHYSICAL EXAM  VITAL SIGNS: /90 mmHg  Pulse 98  Temp(Src) 37 °C (98.6 °F)  Resp 18  Ht 1.575 m (5' 2\")  Wt 65.318 kg (144 lb)  BMI 26.33 kg/m2  Constitutional: Mild distress  HENT:  Moist mucous membranes  Eyes:  No conjunctivitis or icterus  Neck:  trachea is midline, no palpable thyroid  Lymphatic:  No cervical lymphadenopathy  Cardiovascular:  Regular rate and rhythm, no murmurs  Thorax & Lungs:  Normal breath sounds, no rhonchi  Abdomen:  Soft, Non-tender  Skin:. Diffuse red raised blanchable maculopapular rash to the chest. Otherwise skin is warm and dry.    Back:  Non-tender, no CVA tenderness  Extremities:  No edema  Vascular:  symmetric radial pulse  Neurologic:  Normal gross motor, alert and oriented.     LABS  Labs Reviewed   CBC WITH DIFFERENTIAL - Abnormal; Notable for the following:     Lymphocytes 19.30 (*)     All other " "components within normal limits   COMP METABOLIC PANEL - Abnormal; Notable for the following:     Sodium 132 (*)     Alkaline Phosphatase 131 (*)     Globulin 3.9 (*)     All other components within normal limits   ESTIMATED GFR - Abnormal; Notable for the following:     GFR If Non  57 (*)     All other components within normal limits   BTYPE NATRIURETIC PEPTIDE   BLOOD CULTURE    Narrative:     Per Hospital Policy: Only change Specimen Src: to \"Line\" if  specified by physician order.   BLOOD CULTURE    Narrative:     Per Hospital Policy: Only change Specimen Src: to \"Line\" if  specified by physician order.   TROPONIN     All labs reviewed by me.    EKG Interpretation  Interpreted by me  Normal Sinus Rhythm. Rate 91  Normal QTc  Normal QRS  Normal Axis  Diffuse low voltage   Left atrial enlargement   Q waves in V1 and V2 that are old.    RADIOLOGY  DX-CHEST-LIMITED (1 VIEW)   Final Result         No acute cardiac or pulmonary abnormality is identified.        The radiologist's interpretation of all radiological studies have been reviewed by me.    COURSE & MEDICAL DECISION MAKING  Pertinent Labs & Imaging studies reviewed. (See chart for details)    4:49 PM - Patient seen and examined at bedside. Ordered chest x ray, Troponin, Estimated GFR, BNP, blood culture, CBC, CMP, and EKG to evaluate her symptoms. The differential diagnoses include but are not limited to: sinusitis; rule out pneumonia and cardiac ischemia.     4:57 PM recheck with the patient. I discussed with her that the EKG changes noticed at Urgent Care are old changes and I do not suspect she had a cardiac event. She understands that should her Troponin return and be negative she will be able to be discharged home. The patient understands the plan and verbalizes agreement.     4:58 PM I discussed the case with Dr. Garg Cardiology. She is aware of the patient and agrees with my plan to discharge the patient with accelerated follow up " with her in the office as an outpatient.      6:12 PM The patient's troponin was negative. She will be able to be discharged home.     6:20 PM Recheck with the patient. The patient reports feeling improved. Laboratory and radiology results were discussed with the patient which were not indicative of any cardiac events. I also discussed the patient's condition and treatment plan. The patient understood and verbalizes agreement.     Medical Decision Making:  Patient's EKG shows possible Q waves in anterior leads V1 and 2 they look rather insignificant. This was obtained in the prehospital urgent care setting in there was a concern for old MI so she was transferred. All the patient's symptoms are respiratory she has no risks for cardiac disease. Her heart score is 1 recommending immediate discharge. I talked to Dr. Garg of cardiology will have the patient followed up in the accelerated outpatient cardiology follow-up on Monday. She can return precautions.    I will treat her sinusitis with amoxicillin along with Tessalon for cough and albuterol inhaler HFA. She is given return precautions and follow-up    The patient will return for new or worsening symptoms and is stable at the time of discharge.    The patient is referred to a primary physician for blood pressure management, diabetic screening, and for all other preventative health concerns.    DISPOSITION:  Patient will be discharged home in stable condition.    OUTPATIENT MEDICATIONS:  New Prescriptions    ALBUTEROL 108 (90 BASE) MCG/ACT AERO SOLN INHALATION AEROSOL    Inhale 2 Puffs by mouth every 2 hours as needed for Shortness of Breath (or cough).    AMOXICILLIN (AMOXIL) 875 MG TABLET    Take 1 Tab by mouth 2 times a day for 14 days.    BENZONATATE (TESSALON) 200 MG CAPSULE    Take 1 Cap by mouth 3 times a day as needed for Cough.    TRIAMCINOLONE (NASACORT) 55 MCG/ACT NASAL INHALER    Spray 2 Sprays in nose every day.     FINAL IMPRESSION  1. Acute  maxillary sinusitis, recurrence not specified    2. Bronchitis       IFeliz (Scribe), am scribing for, and in the presence of, Miguel Ivan M.D..    Electronically signed by: Feliz Mckeon (Scribe), 3/3/2017    Miguel BARON M.D. personally performed the services described in this documentation, as scribed by Feliz Mckeon in my presence, and it is both accurate and complete.    The note accurately reflects work and decisions made by me.  Miguel Ivan  3/3/2017  6:54 PM

## 2017-03-08 LAB
BACTERIA BLD CULT: NORMAL
BACTERIA BLD CULT: NORMAL
SIGNIFICANT IND 70042: NORMAL
SIGNIFICANT IND 70042: NORMAL
SITE SITE: NORMAL
SITE SITE: NORMAL
SOURCE SOURCE: NORMAL
SOURCE SOURCE: NORMAL

## 2017-08-29 ENCOUNTER — OFFICE VISIT (OUTPATIENT)
Dept: URGENT CARE | Facility: CLINIC | Age: 62
End: 2017-08-29
Payer: COMMERCIAL

## 2017-08-29 VITALS
TEMPERATURE: 98.4 F | BODY MASS INDEX: 24.84 KG/M2 | RESPIRATION RATE: 14 BRPM | HEIGHT: 62 IN | HEART RATE: 70 BPM | DIASTOLIC BLOOD PRESSURE: 88 MMHG | OXYGEN SATURATION: 96 % | WEIGHT: 135 LBS | SYSTOLIC BLOOD PRESSURE: 128 MMHG

## 2017-08-29 DIAGNOSIS — S29.011A MUSCLE STRAIN OF CHEST WALL, INITIAL ENCOUNTER: ICD-10-CM

## 2017-08-29 DIAGNOSIS — R07.9 CHEST PAIN, UNSPECIFIED TYPE: ICD-10-CM

## 2017-08-29 PROCEDURE — 99214 OFFICE O/P EST MOD 30 MIN: CPT | Performed by: FAMILY MEDICINE

## 2017-08-29 PROCEDURE — 99999 CLINIC EKG: CPT | Performed by: FAMILY MEDICINE

## 2017-08-29 PROCEDURE — 93000 ELECTROCARDIOGRAM COMPLETE: CPT | Performed by: FAMILY MEDICINE

## 2017-08-29 NOTE — PROGRESS NOTES
Subjective:      Lisa Yang is a 61 y.o. female who presents with Chest Pain and Jaw Pain    Patient states that she is doing parts of the [place] every weekend hiking about 17 miles. When she was doing it this weekend she started to feel some shortness of breath and some chest discomfort which when she rested felt better. She actually called her primary care provider yesterday with these symptoms asking if she could order an EKG and some lab work outpatient she was advised to go to the emergency room. Patient states that she did not want to go to the emergency room so she just rested today went out shopping and felt this chest discomfort again localized on the left side left arm pain and some bilateral jaw pain and decided to come to urgent care. She denies that she is feeling this discomfort currently. No shortness of breath. Patient does not have any history of cardiac disorder she was actually sent from the urgent care recently with complaints of chest pain was ruled out for any acute disorder at the time diagnosed with bronchitis and treated with antibiotics. She denies otherwise feeling ill no  cough or nasal congestion no fevers or chills. No n,v,d. She has been carrying a heavy backpack while hiking. She denies that there is a positional component to this discomfort. She denies that she has taken any medications to try to help her symptoms. The discomfort is lasted several minutes. No significant family history of heart disease.    HPI  ROS  PMH:  has a past medical history of Hypothyroidism, postsurgical (1998); Papillary carcinoma of thyroid (CMS-HCC) (1998); papillary carcinoma thyroid (Mar 1998); papillary carcinoma thyroid (Nov 1998); papillary carcinoma thyroid (1999); papillary carcinoma thyroid (2003); papillary carcinoma thyroid (2005); S/P cholecystectomy (2006); and S/P hysterectomy with oophorectomy.  MEDS:   Current Outpatient Prescriptions:   •  Calcium 600 MG Tab, Take 3 Tabs by  "mouth every evening., Disp: , Rfl:   •  benzonatate (TESSALON) 200 MG capsule, Take 1 Cap by mouth 3 times a day as needed for Cough., Disp: 30 Cap, Rfl: 1  •  albuterol 108 (90 BASE) MCG/ACT Aero Soln inhalation aerosol, Inhale 2 Puffs by mouth every 2 hours as needed for Shortness of Breath (or cough)., Disp: 1 Inhaler, Rfl: 2  •  triamcinolone (NASACORT) 55 MCG/ACT nasal inhaler, Spray 2 Sprays in nose every day., Disp: 1 Bottle, Rfl: 1  •  ARMOUR THYROID 30 MG Tab, Take 4 tablets every 48  hours. Then take 3 tablets  every 48 hours., Disp: 360 Tab, Rfl: 3  •  calcitRIOL (ROCALTROL) 0.25 MCG Cap, Take 2 Caps by mouth every day., Disp: 180 Cap, Rfl: 3  •  estradiol (VIVELLE DOT) 0.05 MG/24HR PATCH BIWEEKLY, Apply 1 Patch to skin as directed 2X A WEEK. Tuesday/Saturday, Disp: , Rfl:   ALLERGIES:   Allergies   Allergen Reactions   • Codeine    SURGHX:   Past Surgical History:   Procedure Laterality Date   • ABDOMINAL HYSTERECTOMY TOTAL     • APPENDECTOMY     • CHOLECYSTECTOMY     • THYROIDECTOMY     • TUBAL COAGULATION LAPAROSCOPIC BILATERAL     SOCHX:  reports that she has never smoked. She has never used smokeless tobacco. She reports that she drinks alcohol. She reports that she does not use drugs.  FH: Family history was reviewed, no pertinent findings to report     Objective:     /88   Pulse 70   Temp 36.9 °C (98.4 °F)   Resp 14   Ht 1.575 m (5' 2\")   Wt 61.2 kg (135 lb)   SpO2 96%   BMI 24.69 kg/m²      Physical Exam   Constitutional: She is oriented to person, place, and time. She appears well-developed and well-nourished. No distress.   HENT:   Mouth/Throat: Oropharynx is clear and moist.   Eyes: Conjunctivae are normal.   Cardiovascular: Normal rate, regular rhythm, normal heart sounds and intact distal pulses.  Exam reveals no gallop and no friction rub.    No murmur heard.  Pulmonary/Chest: Effort normal and breath sounds normal. No respiratory distress. She has no wheezes. She has no rales. " She exhibits no tenderness.   Musculoskeletal: Normal range of motion.   Neurological: She is alert and oriented to person, place, and time.   Skin: Skin is warm and dry. She is not diaphoretic.   Psychiatric: She has a normal mood and affect. Her behavior is normal.       Diagnostics: EKG compared to that of March 2017 no acute changes     Assessment/Plan:    Chest wall pain possibly musculoskeletal?   Instructed if symptoms occur again to go to the emergency room as we cannot rule out a cardiac event just to the urgent care setting she has some symptoms but her vitals are stable her EKG looks fine.

## 2020-03-17 RX ORDER — ESTRADIOL 0.05 MG/D
1 PATCH, EXTENDED RELEASE TRANSDERMAL
Qty: 12 PATCH | Refills: 2 | Status: SHIPPED | OUTPATIENT
Start: 2020-03-17

## 2020-07-15 ENCOUNTER — HOSPITAL ENCOUNTER (OUTPATIENT)
Facility: MEDICAL CENTER | Age: 65
End: 2020-07-15
Attending: INTERNAL MEDICINE
Payer: COMMERCIAL

## 2020-07-15 PROCEDURE — 82340 ASSAY OF CALCIUM IN URINE: CPT

## 2020-07-18 LAB
CALCIUM 24H UR-MCNC: 17 MG/DL
CALCIUM 24H UR-MRATE: 332 MG/D
CALCIUM/CREAT 24H UR: 395 MG/G (ref 20–300)
COLLECT DURATION TIME SPEC: 24 HRS
CREAT 24H UR-MCNC: 43 MG/DL
CREAT 24H UR-MRATE: 838 MG/D (ref 500–1400)
SPECIMEN VOL ?TM UR: 1950 ML

## 2020-09-17 ENCOUNTER — HOSPITAL ENCOUNTER (OUTPATIENT)
Dept: LAB | Facility: MEDICAL CENTER | Age: 65
End: 2020-09-17
Attending: INTERNAL MEDICINE
Payer: COMMERCIAL

## 2020-09-17 LAB
25(OH)D3 SERPL-MCNC: 36 NG/ML (ref 30–100)
ALBUMIN SERPL BCP-MCNC: 4.5 G/DL (ref 3.2–4.9)
BUN SERPL-MCNC: 12 MG/DL (ref 8–22)
CALCIUM SERPL-MCNC: 9.3 MG/DL (ref 8.5–10.5)
CHLORIDE SERPL-SCNC: 103 MMOL/L (ref 96–112)
CO2 SERPL-SCNC: 25 MMOL/L (ref 20–33)
CREAT SERPL-MCNC: 1 MG/DL (ref 0.5–1.4)
GLUCOSE SERPL-MCNC: 93 MG/DL (ref 65–99)
PHOSPHATE SERPL-MCNC: 3.7 MG/DL (ref 2.5–4.5)
POTASSIUM SERPL-SCNC: 4.3 MMOL/L (ref 3.6–5.5)
PTH-INTACT SERPL-MCNC: 6.5 PG/ML (ref 14–72)
SODIUM SERPL-SCNC: 140 MMOL/L (ref 135–145)
T4 FREE SERPL-MCNC: 0.86 NG/DL (ref 0.93–1.7)
TSH SERPL DL<=0.005 MIU/L-ACNC: 0.94 UIU/ML (ref 0.38–5.33)

## 2020-09-17 PROCEDURE — 80069 RENAL FUNCTION PANEL: CPT

## 2020-09-17 PROCEDURE — 84443 ASSAY THYROID STIM HORMONE: CPT

## 2020-09-17 PROCEDURE — 36415 COLL VENOUS BLD VENIPUNCTURE: CPT

## 2020-09-17 PROCEDURE — 84439 ASSAY OF FREE THYROXINE: CPT

## 2020-09-17 PROCEDURE — 82306 VITAMIN D 25 HYDROXY: CPT

## 2020-09-17 PROCEDURE — 83970 ASSAY OF PARATHORMONE: CPT

## 2020-11-06 ENCOUNTER — HOSPITAL ENCOUNTER (OUTPATIENT)
Dept: LAB | Facility: MEDICAL CENTER | Age: 65
End: 2020-11-06
Attending: INTERNAL MEDICINE
Payer: COMMERCIAL

## 2020-11-06 LAB
ALBUMIN SERPL BCP-MCNC: 4.4 G/DL (ref 3.2–4.9)
BUN SERPL-MCNC: 18 MG/DL (ref 8–22)
CALCIUM SERPL-MCNC: 9.2 MG/DL (ref 8.5–10.5)
CHLORIDE SERPL-SCNC: 98 MMOL/L (ref 96–112)
CO2 SERPL-SCNC: 25 MMOL/L (ref 20–33)
CREAT SERPL-MCNC: 0.94 MG/DL (ref 0.5–1.4)
GLUCOSE SERPL-MCNC: 74 MG/DL (ref 65–99)
MAGNESIUM SERPL-MCNC: 1.8 MG/DL (ref 1.5–2.5)
PHOSPHATE SERPL-MCNC: 3.2 MG/DL (ref 2.5–4.5)
POTASSIUM SERPL-SCNC: 3.9 MMOL/L (ref 3.6–5.5)
SODIUM SERPL-SCNC: 136 MMOL/L (ref 135–145)

## 2020-11-06 PROCEDURE — 82306 VITAMIN D 25 HYDROXY: CPT

## 2020-11-06 PROCEDURE — 83735 ASSAY OF MAGNESIUM: CPT

## 2020-11-06 PROCEDURE — 36415 COLL VENOUS BLD VENIPUNCTURE: CPT

## 2020-11-06 PROCEDURE — 84443 ASSAY THYROID STIM HORMONE: CPT

## 2020-11-06 PROCEDURE — 84439 ASSAY OF FREE THYROXINE: CPT

## 2020-11-06 PROCEDURE — 80069 RENAL FUNCTION PANEL: CPT

## 2020-11-07 LAB
25(OH)D3 SERPL-MCNC: 43 NG/ML (ref 30–100)
T4 FREE SERPL-MCNC: 0.8 NG/DL (ref 0.93–1.7)
TSH SERPL DL<=0.005 MIU/L-ACNC: 0.84 UIU/ML (ref 0.38–5.33)

## 2020-12-07 ENCOUNTER — HOSPITAL ENCOUNTER (OUTPATIENT)
Dept: LAB | Facility: MEDICAL CENTER | Age: 65
End: 2020-12-07
Attending: INTERNAL MEDICINE
Payer: COMMERCIAL

## 2020-12-07 LAB
25(OH)D3 SERPL-MCNC: 53 NG/ML (ref 30–100)
ALBUMIN SERPL BCP-MCNC: 4.5 G/DL (ref 3.2–4.9)
ALBUMIN/GLOB SERPL: 1.5 G/DL
ALP SERPL-CCNC: 83 U/L (ref 30–99)
ALT SERPL-CCNC: 15 U/L (ref 2–50)
ANION GAP SERPL CALC-SCNC: 12 MMOL/L (ref 7–16)
APPEARANCE UR: CLEAR
AST SERPL-CCNC: 15 U/L (ref 12–45)
BASOPHILS # BLD AUTO: 1 % (ref 0–1.8)
BASOPHILS # BLD: 0.07 K/UL (ref 0–0.12)
BILIRUB SERPL-MCNC: 0.3 MG/DL (ref 0.1–1.5)
BILIRUB UR QL STRIP.AUTO: NEGATIVE
BUN SERPL-MCNC: 17 MG/DL (ref 8–22)
C3 SERPL-MCNC: 131.8 MG/DL (ref 87–200)
C4 SERPL-MCNC: 30.5 MG/DL (ref 19–52)
CALCIUM SERPL-MCNC: 9.3 MG/DL (ref 8.5–10.5)
CHLORIDE SERPL-SCNC: 102 MMOL/L (ref 96–112)
CO2 SERPL-SCNC: 25 MMOL/L (ref 20–33)
COLOR UR: YELLOW
CREAT SERPL-MCNC: 0.98 MG/DL (ref 0.5–1.4)
CREAT UR-MCNC: 41.73 MG/DL
CREAT UR-MCNC: 42.14 MG/DL
EOSINOPHIL # BLD AUTO: 0.39 K/UL (ref 0–0.51)
EOSINOPHIL NFR BLD: 5.4 % (ref 0–6.9)
ERYTHROCYTE [DISTWIDTH] IN BLOOD BY AUTOMATED COUNT: 41.8 FL (ref 35.9–50)
GLOBULIN SER CALC-MCNC: 3 G/DL (ref 1.9–3.5)
GLUCOSE SERPL-MCNC: 77 MG/DL (ref 65–99)
GLUCOSE UR STRIP.AUTO-MCNC: NEGATIVE MG/DL
HCT VFR BLD AUTO: 41.3 % (ref 37–47)
HGB BLD-MCNC: 13.8 G/DL (ref 12–16)
IMM GRANULOCYTES # BLD AUTO: 0.02 K/UL (ref 0–0.11)
IMM GRANULOCYTES NFR BLD AUTO: 0.3 % (ref 0–0.9)
KETONES UR STRIP.AUTO-MCNC: NEGATIVE MG/DL
LEUKOCYTE ESTERASE UR QL STRIP.AUTO: NEGATIVE
LYMPHOCYTES # BLD AUTO: 2.13 K/UL (ref 1–4.8)
LYMPHOCYTES NFR BLD: 29.5 % (ref 22–41)
MAGNESIUM SERPL-MCNC: 2 MG/DL (ref 1.5–2.5)
MCH RBC QN AUTO: 29.8 PG (ref 27–33)
MCHC RBC AUTO-ENTMCNC: 33.4 G/DL (ref 33.6–35)
MCV RBC AUTO: 89.2 FL (ref 81.4–97.8)
MICRO URNS: NORMAL
MICROALBUMIN UR-MCNC: <1.2 MG/DL
MICROALBUMIN/CREAT UR: NORMAL MG/G (ref 0–30)
MONOCYTES # BLD AUTO: 0.5 K/UL (ref 0–0.85)
MONOCYTES NFR BLD AUTO: 6.9 % (ref 0–13.4)
NEUTROPHILS # BLD AUTO: 4.12 K/UL (ref 2–7.15)
NEUTROPHILS NFR BLD: 56.9 % (ref 44–72)
NITRITE UR QL STRIP.AUTO: NEGATIVE
NRBC # BLD AUTO: 0 K/UL
NRBC BLD-RTO: 0 /100 WBC
PH UR STRIP.AUTO: 6.5 [PH] (ref 5–8)
PHOSPHATE SERPL-MCNC: 3.5 MG/DL (ref 2.5–4.5)
PLATELET # BLD AUTO: 281 K/UL (ref 164–446)
PMV BLD AUTO: 11.5 FL (ref 9–12.9)
POTASSIUM SERPL-SCNC: 3.7 MMOL/L (ref 3.6–5.5)
PROT SERPL-MCNC: 7.5 G/DL (ref 6–8.2)
PROT UR QL STRIP: NEGATIVE MG/DL
PROT UR-MCNC: <4 MG/DL (ref 0–15)
PTH-INTACT SERPL-MCNC: 7.5 PG/ML (ref 14–72)
RBC # BLD AUTO: 4.63 M/UL (ref 4.2–5.4)
RBC UR QL AUTO: NEGATIVE
SODIUM SERPL-SCNC: 139 MMOL/L (ref 135–145)
SP GR UR STRIP.AUTO: 1.01
URATE SERPL-MCNC: 5.5 MG/DL (ref 1.9–8.2)
UROBILINOGEN UR STRIP.AUTO-MCNC: 0.2 MG/DL
WBC # BLD AUTO: 7.2 K/UL (ref 4.8–10.8)

## 2020-12-07 PROCEDURE — 81003 URINALYSIS AUTO W/O SCOPE: CPT

## 2020-12-07 PROCEDURE — 82043 UR ALBUMIN QUANTITATIVE: CPT

## 2020-12-07 PROCEDURE — 83970 ASSAY OF PARATHORMONE: CPT

## 2020-12-07 PROCEDURE — 86255 FLUORESCENT ANTIBODY SCREEN: CPT

## 2020-12-07 PROCEDURE — 86160 COMPLEMENT ANTIGEN: CPT | Mod: 91

## 2020-12-07 PROCEDURE — 82570 ASSAY OF URINE CREATININE: CPT

## 2020-12-07 PROCEDURE — 84550 ASSAY OF BLOOD/URIC ACID: CPT

## 2020-12-07 PROCEDURE — 80053 COMPREHEN METABOLIC PANEL: CPT

## 2020-12-07 PROCEDURE — 82306 VITAMIN D 25 HYDROXY: CPT

## 2020-12-07 PROCEDURE — 36415 COLL VENOUS BLD VENIPUNCTURE: CPT

## 2020-12-07 PROCEDURE — 85025 COMPLETE CBC W/AUTO DIFF WBC: CPT

## 2020-12-07 PROCEDURE — 84156 ASSAY OF PROTEIN URINE: CPT

## 2020-12-07 PROCEDURE — 84100 ASSAY OF PHOSPHORUS: CPT

## 2020-12-07 PROCEDURE — 86162 COMPLEMENT TOTAL (CH50): CPT

## 2020-12-07 PROCEDURE — 83735 ASSAY OF MAGNESIUM: CPT

## 2020-12-09 LAB
ANCA IGG TITR SER IF: NORMAL {TITER}
CH50 SERPL-ACNC: 88.6 U/ML (ref 38.7–89.9)

## 2020-12-24 ENCOUNTER — PRE-ADMISSION TESTING (OUTPATIENT)
Dept: ADMISSIONS | Facility: MEDICAL CENTER | Age: 65
End: 2020-12-24
Attending: ORTHOPAEDIC SURGERY
Payer: COMMERCIAL

## 2020-12-24 DIAGNOSIS — Z01.812 PRE-OPERATIVE LABORATORY EXAMINATION: ICD-10-CM

## 2020-12-24 DIAGNOSIS — Z01.810 PRE-OPERATIVE CARDIOVASCULAR EXAMINATION: ICD-10-CM

## 2020-12-24 LAB
COVID ORDER STATUS COVID19: NORMAL
EKG IMPRESSION: NORMAL

## 2020-12-24 PROCEDURE — U0003 INFECTIOUS AGENT DETECTION BY NUCLEIC ACID (DNA OR RNA); SEVERE ACUTE RESPIRATORY SYNDROME CORONAVIRUS 2 (SARS-COV-2) (CORONAVIRUS DISEASE [COVID-19]), AMPLIFIED PROBE TECHNIQUE, MAKING USE OF HIGH THROUGHPUT TECHNOLOGIES AS DESCRIBED BY CMS-2020-01-R: HCPCS

## 2020-12-24 PROCEDURE — C9803 HOPD COVID-19 SPEC COLLECT: HCPCS

## 2020-12-24 PROCEDURE — 93010 ELECTROCARDIOGRAM REPORT: CPT | Performed by: INTERNAL MEDICINE

## 2020-12-24 PROCEDURE — 93005 ELECTROCARDIOGRAM TRACING: CPT

## 2020-12-24 RX ORDER — MULTIVIT-MIN/IRON/FOLIC ACID/K 18-600-40
CAPSULE ORAL DAILY
COMMUNITY

## 2020-12-24 RX ORDER — ACETAMINOPHEN 325 MG/1
650 TABLET ORAL EVERY 4 HOURS PRN
COMMUNITY

## 2020-12-24 SDOH — HEALTH STABILITY: MENTAL HEALTH: HOW OFTEN DO YOU HAVE 6 OR MORE DRINKS ON ONE OCCASION?: NEVER

## 2020-12-24 NOTE — OR NURSING
"Preadmit appointment: \" Preparing for your Procedure information\" sheet given to patient with verbal and written instructions(given to pt when she comes in for her covid/ekg today). Patient instructed to continue prescribed medications through the day before surgery, instructed to take the following medications the day of surgery per anesthesia protocol: Pierce thyroid, Albuterol inhaler if needed   Verbal and written instructions on self isolation until surgery and covid symptoms to watch for given to patient and patient instructed to call MD if any symptoms develop prior to surgery/procedure. covid testing to be done today and pt agreed to self isolate  "

## 2020-12-25 LAB
SARS-COV-2 RNA RESP QL NAA+PROBE: NOTDETECTED
SPECIMEN SOURCE: NORMAL

## 2020-12-28 ENCOUNTER — ANESTHESIA EVENT (OUTPATIENT)
Dept: SURGERY | Facility: MEDICAL CENTER | Age: 65
End: 2020-12-28
Payer: COMMERCIAL

## 2020-12-29 ENCOUNTER — HOSPITAL ENCOUNTER (OUTPATIENT)
Facility: MEDICAL CENTER | Age: 65
End: 2020-12-29
Attending: ORTHOPAEDIC SURGERY | Admitting: ORTHOPAEDIC SURGERY
Payer: COMMERCIAL

## 2020-12-29 ENCOUNTER — ANESTHESIA (OUTPATIENT)
Dept: SURGERY | Facility: MEDICAL CENTER | Age: 65
End: 2020-12-29
Payer: COMMERCIAL

## 2020-12-29 ENCOUNTER — APPOINTMENT (OUTPATIENT)
Dept: RADIOLOGY | Facility: MEDICAL CENTER | Age: 65
End: 2020-12-29
Attending: ORTHOPAEDIC SURGERY
Payer: COMMERCIAL

## 2020-12-29 VITALS
TEMPERATURE: 97.5 F | WEIGHT: 154.1 LBS | OXYGEN SATURATION: 96 % | SYSTOLIC BLOOD PRESSURE: 127 MMHG | DIASTOLIC BLOOD PRESSURE: 65 MMHG | BODY MASS INDEX: 28.36 KG/M2 | HEIGHT: 62 IN | HEART RATE: 86 BPM | RESPIRATION RATE: 16 BRPM

## 2020-12-29 DIAGNOSIS — S63.641A RUPTURE OF ULNAR COLLATERAL LIGAMENT OF RIGHT THUMB, INITIAL ENCOUNTER: ICD-10-CM

## 2020-12-29 PROCEDURE — 160009 HCHG ANES TIME/MIN: Performed by: ORTHOPAEDIC SURGERY

## 2020-12-29 PROCEDURE — 700102 HCHG RX REV CODE 250 W/ 637 OVERRIDE(OP): Performed by: ANESTHESIOLOGY

## 2020-12-29 PROCEDURE — 160046 HCHG PACU - 1ST 60 MINS PHASE II: Performed by: ORTHOPAEDIC SURGERY

## 2020-12-29 PROCEDURE — 700101 HCHG RX REV CODE 250: Performed by: ANESTHESIOLOGY

## 2020-12-29 PROCEDURE — 700105 HCHG RX REV CODE 258: Performed by: ORTHOPAEDIC SURGERY

## 2020-12-29 PROCEDURE — A9270 NON-COVERED ITEM OR SERVICE: HCPCS | Performed by: ORTHOPAEDIC SURGERY

## 2020-12-29 PROCEDURE — A9270 NON-COVERED ITEM OR SERVICE: HCPCS | Performed by: ANESTHESIOLOGY

## 2020-12-29 PROCEDURE — 700101 HCHG RX REV CODE 250: Performed by: ORTHOPAEDIC SURGERY

## 2020-12-29 PROCEDURE — 502576 HCHG PACK, HAND: Performed by: ORTHOPAEDIC SURGERY

## 2020-12-29 PROCEDURE — 160036 HCHG PACU - EA ADDL 30 MINS PHASE I: Performed by: ORTHOPAEDIC SURGERY

## 2020-12-29 PROCEDURE — 502000 HCHG MISC OR IMPLANTS RC 0278: Performed by: ORTHOPAEDIC SURGERY

## 2020-12-29 PROCEDURE — 700111 HCHG RX REV CODE 636 W/ 250 OVERRIDE (IP): Performed by: ANESTHESIOLOGY

## 2020-12-29 PROCEDURE — 160002 HCHG RECOVERY MINUTES (STAT): Performed by: ORTHOPAEDIC SURGERY

## 2020-12-29 PROCEDURE — 160035 HCHG PACU - 1ST 60 MINS PHASE I: Performed by: ORTHOPAEDIC SURGERY

## 2020-12-29 PROCEDURE — 160039 HCHG SURGERY MINUTES - EA ADDL 1 MIN LEVEL 3: Performed by: ORTHOPAEDIC SURGERY

## 2020-12-29 PROCEDURE — 160028 HCHG SURGERY MINUTES - 1ST 30 MINS LEVEL 3: Performed by: ORTHOPAEDIC SURGERY

## 2020-12-29 PROCEDURE — 160025 RECOVERY II MINUTES (STATS): Performed by: ORTHOPAEDIC SURGERY

## 2020-12-29 PROCEDURE — 501838 HCHG SUTURE GENERAL: Performed by: ORTHOPAEDIC SURGERY

## 2020-12-29 PROCEDURE — 160048 HCHG OR STATISTICAL LEVEL 1-5: Performed by: ORTHOPAEDIC SURGERY

## 2020-12-29 DEVICE — IMPLANTABLE DEVICE: Type: IMPLANTABLE DEVICE | Site: THUMB | Status: FUNCTIONAL

## 2020-12-29 RX ORDER — ONDANSETRON 2 MG/ML
4 INJECTION INTRAMUSCULAR; INTRAVENOUS
Status: DISCONTINUED | OUTPATIENT
Start: 2020-12-29 | End: 2020-12-29 | Stop reason: HOSPADM

## 2020-12-29 RX ORDER — HALOPERIDOL 5 MG/ML
1 INJECTION INTRAMUSCULAR
Status: DISCONTINUED | OUTPATIENT
Start: 2020-12-29 | End: 2020-12-29 | Stop reason: HOSPADM

## 2020-12-29 RX ORDER — ACETAMINOPHEN 325 MG/1
650 TABLET ORAL ONCE
Status: COMPLETED | OUTPATIENT
Start: 2020-12-29 | End: 2020-12-29

## 2020-12-29 RX ORDER — MEPERIDINE HYDROCHLORIDE 25 MG/ML
INJECTION INTRAMUSCULAR; INTRAVENOUS; SUBCUTANEOUS PRN
Status: DISCONTINUED | OUTPATIENT
Start: 2020-12-29 | End: 2020-12-29 | Stop reason: SURG

## 2020-12-29 RX ORDER — SUCCINYLCHOLINE/SOD CL,ISO/PF 200MG/10ML
SYRINGE (ML) INTRAVENOUS PRN
Status: DISCONTINUED | OUTPATIENT
Start: 2020-12-29 | End: 2020-12-29 | Stop reason: SURG

## 2020-12-29 RX ORDER — TRAMADOL HYDROCHLORIDE 50 MG/1
50 TABLET ORAL EVERY 6 HOURS PRN
Qty: 25 TAB | Refills: 0 | Status: SHIPPED | OUTPATIENT
Start: 2020-12-29 | End: 2021-01-05

## 2020-12-29 RX ORDER — MEPERIDINE HYDROCHLORIDE 25 MG/ML
25 INJECTION INTRAMUSCULAR; INTRAVENOUS; SUBCUTANEOUS
Status: DISCONTINUED | OUTPATIENT
Start: 2020-12-29 | End: 2020-12-29 | Stop reason: HOSPADM

## 2020-12-29 RX ORDER — MIDAZOLAM HYDROCHLORIDE 1 MG/ML
1 INJECTION INTRAMUSCULAR; INTRAVENOUS
Status: DISCONTINUED | OUTPATIENT
Start: 2020-12-29 | End: 2020-12-29 | Stop reason: HOSPADM

## 2020-12-29 RX ORDER — BUPIVACAINE HYDROCHLORIDE 5 MG/ML
INJECTION, SOLUTION EPIDURAL; INTRACAUDAL
Status: DISCONTINUED | OUTPATIENT
Start: 2020-12-29 | End: 2020-12-29 | Stop reason: HOSPADM

## 2020-12-29 RX ORDER — SODIUM CHLORIDE, SODIUM LACTATE, POTASSIUM CHLORIDE, CALCIUM CHLORIDE 600; 310; 30; 20 MG/100ML; MG/100ML; MG/100ML; MG/100ML
INJECTION, SOLUTION INTRAVENOUS CONTINUOUS
Status: DISCONTINUED | OUTPATIENT
Start: 2020-12-29 | End: 2020-12-29 | Stop reason: HOSPADM

## 2020-12-29 RX ORDER — OXYCODONE HCL 5 MG/5 ML
5 SOLUTION, ORAL ORAL
Status: COMPLETED | OUTPATIENT
Start: 2020-12-29 | End: 2020-12-29

## 2020-12-29 RX ORDER — KETOROLAC TROMETHAMINE 30 MG/ML
INJECTION, SOLUTION INTRAMUSCULAR; INTRAVENOUS PRN
Status: DISCONTINUED | OUTPATIENT
Start: 2020-12-29 | End: 2020-12-29 | Stop reason: SURG

## 2020-12-29 RX ORDER — LIDOCAINE HYDROCHLORIDE 10 MG/ML
INJECTION, SOLUTION INFILTRATION; PERINEURAL
Status: DISCONTINUED | OUTPATIENT
Start: 2020-12-29 | End: 2020-12-29 | Stop reason: HOSPADM

## 2020-12-29 RX ORDER — DIPHENHYDRAMINE HYDROCHLORIDE 50 MG/ML
12.5 INJECTION INTRAMUSCULAR; INTRAVENOUS
Status: DISCONTINUED | OUTPATIENT
Start: 2020-12-29 | End: 2020-12-29 | Stop reason: HOSPADM

## 2020-12-29 RX ORDER — CEFAZOLIN SODIUM 1 G/3ML
INJECTION, POWDER, FOR SOLUTION INTRAMUSCULAR; INTRAVENOUS PRN
Status: DISCONTINUED | OUTPATIENT
Start: 2020-12-29 | End: 2020-12-29 | Stop reason: SURG

## 2020-12-29 RX ORDER — ONDANSETRON 2 MG/ML
INJECTION INTRAMUSCULAR; INTRAVENOUS PRN
Status: DISCONTINUED | OUTPATIENT
Start: 2020-12-29 | End: 2020-12-29 | Stop reason: SURG

## 2020-12-29 RX ORDER — LIDOCAINE HYDROCHLORIDE 20 MG/ML
INJECTION, SOLUTION EPIDURAL; INFILTRATION; INTRACAUDAL; PERINEURAL PRN
Status: DISCONTINUED | OUTPATIENT
Start: 2020-12-29 | End: 2020-12-29 | Stop reason: SURG

## 2020-12-29 RX ORDER — LABETALOL HYDROCHLORIDE 5 MG/ML
5 INJECTION, SOLUTION INTRAVENOUS
Status: DISCONTINUED | OUTPATIENT
Start: 2020-12-29 | End: 2020-12-29 | Stop reason: HOSPADM

## 2020-12-29 RX ORDER — OXYCODONE HCL 5 MG/5 ML
10 SOLUTION, ORAL ORAL
Status: COMPLETED | OUTPATIENT
Start: 2020-12-29 | End: 2020-12-29

## 2020-12-29 RX ORDER — DEXAMETHASONE SODIUM PHOSPHATE 4 MG/ML
INJECTION, SOLUTION INTRA-ARTICULAR; INTRALESIONAL; INTRAMUSCULAR; INTRAVENOUS; SOFT TISSUE PRN
Status: DISCONTINUED | OUTPATIENT
Start: 2020-12-29 | End: 2020-12-29 | Stop reason: SURG

## 2020-12-29 RX ADMIN — Medication 20 MG: at 13:23

## 2020-12-29 RX ADMIN — ALFENTANIL HYDROCHLORIDE 250 MCG: 500 INJECTION, SOLUTION INTRAVENOUS at 13:24

## 2020-12-29 RX ADMIN — Medication 5 MG: at 13:11

## 2020-12-29 RX ADMIN — OXYCODONE HYDROCHLORIDE 2.5 MG: 5 SOLUTION ORAL at 15:15

## 2020-12-29 RX ADMIN — ONDANSETRON 4 MG: 2 INJECTION INTRAMUSCULAR; INTRAVENOUS at 14:02

## 2020-12-29 RX ADMIN — SODIUM CHLORIDE, POTASSIUM CHLORIDE, SODIUM LACTATE AND CALCIUM CHLORIDE: 600; 310; 30; 20 INJECTION, SOLUTION INTRAVENOUS at 12:50

## 2020-12-29 RX ADMIN — DEXAMETHASONE SODIUM PHOSPHATE 4 MG: 4 INJECTION, SOLUTION INTRAMUSCULAR; INTRAVENOUS at 13:16

## 2020-12-29 RX ADMIN — Medication 40 MG: at 13:13

## 2020-12-29 RX ADMIN — MEPERIDINE HYDROCHLORIDE 12.5 MG: 25 INJECTION INTRAMUSCULAR; INTRAVENOUS; SUBCUTANEOUS at 13:35

## 2020-12-29 RX ADMIN — ALFENTANIL HYDROCHLORIDE 500 MCG: 500 INJECTION, SOLUTION INTRAVENOUS at 13:11

## 2020-12-29 RX ADMIN — PROPOFOL 20 MG: 10 INJECTION, EMULSION INTRAVENOUS at 13:11

## 2020-12-29 RX ADMIN — LIDOCAINE HYDROCHLORIDE 20 MG: 20 INJECTION, SOLUTION EPIDURAL; INFILTRATION; INTRACAUDAL; PERINEURAL at 13:11

## 2020-12-29 RX ADMIN — PROPOFOL 80 MG: 10 INJECTION, EMULSION INTRAVENOUS at 13:13

## 2020-12-29 RX ADMIN — ALFENTANIL HYDROCHLORIDE 250 MCG: 500 INJECTION, SOLUTION INTRAVENOUS at 13:30

## 2020-12-29 RX ADMIN — CEFAZOLIN 2 G: 1 INJECTION, POWDER, FOR SOLUTION INTRAVENOUS at 13:10

## 2020-12-29 RX ADMIN — ACETAMINOPHEN 650 MG: 325 TABLET, FILM COATED ORAL at 14:25

## 2020-12-29 RX ADMIN — OXYCODONE HYDROCHLORIDE 2.5 MG: 5 SOLUTION ORAL at 14:42

## 2020-12-29 RX ADMIN — KETOROLAC TROMETHAMINE 30 MG: 30 INJECTION, SOLUTION INTRAMUSCULAR at 13:29

## 2020-12-29 RX ADMIN — POVIDONE IODINE 15 ML: 100 SOLUTION TOPICAL at 12:05

## 2020-12-29 ASSESSMENT — PAIN DESCRIPTION - PAIN TYPE: TYPE: SURGICAL PAIN

## 2020-12-29 ASSESSMENT — PAIN SCALES - GENERAL: PAIN_LEVEL: 0

## 2020-12-29 ASSESSMENT — FIBROSIS 4 INDEX: FIB4 SCORE: 0.88

## 2020-12-29 NOTE — OR SURGEON
Immediate Post OP Note    PreOp Diagnosis: right thumb ulnar collateral ligament rupture    PostOp Diagnosis: same    Procedure(s):  REPAIR, LIGAMENT - ULNAR COLLAGERAL THUMB - Wound Class: Clean    Surgeon(s):  Farhad Plata M.D.    Anesthesiologist/Type of Anesthesia:  Anesthesiologist: Anup Goodwin M.D./JUANITA    Surgical Staff:  Circulator: Macarena Castillo R.N.  Scrub Person: Ron Sal    Specimens removed if any:  * No specimens in log *    Estimated Blood Loss: <10cc    Findings: torn UCL with stenor lesion    Complications: none    Implants: Arthrex internal brace, 3-0 fibertape        12/29/2020 2:15 PM Farhad Plata M.D.

## 2020-12-29 NOTE — OP REPORT
DATE OF SERVICE:  12/29/2020     PREOPERATIVE DIAGNOSIS:  Right thumb ulnar collateral ligament rupture.     POSTOPERATIVE DIAGNOSIS:  Right thumb ulnar collateral ligament rupture.     PROCEDURE:  Ulnar collateral ligament primary repair with Arthrex internal   brace augmentation.     SURGEON:  Farhad Plata MD     ANESTHESIOLOGIST:  Anup Goodwin MD     ANESTHESIA:  General.     ESTIMATED BLOOD LOSS:  Less than 10 mL.     COMPLICATIONS:  None.     DISPOSITION:  Stable to PACU.     TOURNIQUET TIME:  55 minutes.     IMPLANTS:  Arthrex internal brace.     OPERATIVE INDICATIONS:  The patient is a very pleasant 65-year-old right hand   dominant female who presented to my office with instability of her thumb.  MRI   showed a concern for an ulnar collateral ligament rupture with a Stener   lesion.  Therefore, I did discuss with her operative fixation.  Risks and   benefits of the procedure including but not limited damage to surrounding   nerves, arteries, veins, infection, recurrence, incomplete symptom relief,   need for reoperation were all discussed.  Despite these risks, the patient did   consent.     OPERATION IN DETAIL:  On 12/29/2020, the patient was seen in the preoperative   area.  Her right thumb was marked.  All of her questions were answered and   again consent was obtained, the patient was brought into the operating room   and placed in the supine position.  General anesthesia was administered.  A   formal timeout was performed identifying the right thumb as the correct thumb   as well as correct procedure.  Perioperative Ancef was given.  The right arm   was then prepped, marked and draped in the normal sterile fashion.  The limb   was elevated, exsanguinated with an Esmarch, and the tourniquet inflated to   250 mmHg.  Using a curvilinear incision along the ulnar border of the thumb   MCP joint, the skin was incised.  Full thickness flaps were kept down to the   subcutaneous tissue.  The  neurovascular bundle was identified volarly and   protected.  The Stener lesion was easily identified.  There was a small amount   of scar tissue and the collateral ligament was freed.  The aponeurosis was   then released 1-2 mm ulnar to the EPL tendon.  The joint was easily visible   and debrided.  A K-wire from the Arthrex set was then placed at the volar   aspect of the proximal phalanx as well as at the proximal aspect of the   insertion of the collateral ligament.  X-ray confirmed good positioning and   both K-wires were then overdrilled with a 3 mm drill with a 3-0 FiberWire   suture as well as the FiberWire tape.  The distal anchor was gently placed and   a primary repair performed of the collateral ligament.  The suture tape was   then passed over the collateral ligament in 30 degrees of flexion tension to   the proximal drill hole.  The thumb had good stability.  There was no   instability to valgus stress.  The wound was copiously irrigated.  The   aponeurosis was repaired with a 2-0 Vicryl and the wound closed with 4-0 nylon   interrupted sutures.  A 15 mL of 1% lidocaine and 0.5% bupivacaine plain was   injected subcutaneously around the incision site.  Xeroform, soft roll, 4 x 4,   and a thumb spica splint was applied.  The patient tolerated the procedure   well.  She was brought to the recovery room in good condition.  She will   follow up with me in 2 weeks for reevaluation and reassessment.        ______________________________  MD CHARLENE Saab/CARLOS    DD:  12/29/2020 14:21  DT:  12/29/2020 15:45    Job#:  [id number]

## 2020-12-29 NOTE — OR NURSING
Pt to pre op accompanied by her . Procedure, allergies and NPO status verified. Triple Aim performed. Pt dressed in a surgical gown and SCDs applied. Site prep performed. IV started. Pt belongings secured.

## 2020-12-29 NOTE — ANESTHESIA TIME REPORT
Anesthesia Start and Stop Event Times     Date Time Event    12/29/2020 1301 Ready for Procedure     1310 Anesthesia Start     1413 Anesthesia Stop        Responsible Staff  12/29/20    Name Role Begin End    Anup Goodwin M.D. Anesth 1310 1413        Preop Diagnosis (Free Text):  Pre-op Diagnosis     RUPTURE OF ULNAR COLLATERAL LIGAMENT OF RIGHT THUMB        Preop Diagnosis (Codes):    Post op Diagnosis  Injury of ulnar collateral ligament of right wrist      Premium Reason  Non-Premium    Comments:

## 2020-12-29 NOTE — ANESTHESIA POSTPROCEDURE EVALUATION
Patient: Lisa Yang    Procedure Summary     Date: 12/29/20 Room / Location:  OR  / SURGERY [facility]    Anesthesia Start: 1310 Anesthesia Stop: 1413    Procedure: REPAIR, LIGAMENT - ULNAR COLLAGERAL THUMB (Right Thumb) Diagnosis: (RUPTURE OF ULNAR COLLATERAL LIGAMENT OF RIGHT THUMB)    Surgeons: Farhad Plata M.D. Responsible Provider: Anup Goodwin M.D.    Anesthesia Type: general ASA Status: 2          Final Anesthesia Type: general  Last vitals  BP   Blood Pressure: 141/63    Temp   36.6 °C (97.9 °F)    Pulse   Pulse: 70   Resp   16    SpO2   99 %      Anesthesia Post Evaluation    Patient location during evaluation: PACU  Patient participation: complete - patient participated  Level of consciousness: awake and alert  Pain score: 0    Airway patency: patent  Anesthetic complications: no  Cardiovascular status: hemodynamically stable  Respiratory status: acceptable  Hydration status: euvolemic    PONV: none           Nurse Pain Score: 0 (NPRS)

## 2020-12-29 NOTE — ANESTHESIA PREPROCEDURE EVALUATION
Relevant Problems   ENDO   (+) Hypothyroidism, postsurgical       Physical Exam    Airway   Mallampati: II  TM distance: >3 FB  Neck ROM: full       Cardiovascular - normal exam  Rhythm: regular  Rate: normal     Dental - normal exam           Pulmonary - normal exam  Breath sounds clear to auscultation     Abdominal    Neurological - normal exam                 Anesthesia Plan    ASA 2       Plan - general       Airway plan will be LMA        Induction: intravenous    Postoperative Plan: Postoperative administration of opioids is intended.    Pertinent diagnostic labs and testing reviewed    Informed Consent:    Anesthetic plan and risks discussed with patient.    Use of blood products discussed with: patient whom consented to blood products.

## 2020-12-29 NOTE — ANESTHESIA PROCEDURE NOTES
Airway    Date/Time: 12/29/2020 1:14 PM  Performed by: Anup Goodwin M.D.  Authorized by: Anup Goodwin M.D.         Final Airway Type:  Supraglottic airway  Final Supraglottic Airway:  Standard LMA    SGA Size:  4

## 2020-12-29 NOTE — DISCHARGE INSTRUCTIONS
ACTIVITY: Rest and take it easy for the first 24 hours.  A responsible adult is recommended to remain with you during that time.  It is normal to feel sleepy.  We encourage you to not do anything that requires balance, judgment or coordination.    MILD FLU-LIKE SYMPTOMS ARE NORMAL. YOU MAY EXPERIENCE GENERALIZED MUSCLE ACHES, THROAT IRRITATION, HEADACHE AND/OR SOME NAUSEA.    FOR 24 HOURS DO NOT:  Drive, operate machinery or run household appliances.  Drink beer or alcoholic beverages.   Make important decisions or sign legal documents.    SPECIAL INSTRUCTIONS: Ice and elevate.     DIET: To avoid nausea, slowly advance diet as tolerated, avoiding spicy or greasy foods for the first day. Add more substantial food to your diet according to your physician's instructions. INCREASE FLUIDS AND FIBER TO AVOID CONSTIPATION.    SURGICAL DRESSING/BATHING: Keep dressing clean, dry and intact until instructed otherwise by surgeon.    FOLLOW-UP APPOINTMENT:  A follow-up appointment should be arranged with your doctor; call to schedule.    You should CALL YOUR PHYSICIAN if you develop:  Fever greater than 101 degrees F.  Pain not relieved by medication, or persistent nausea or vomiting.  Excessive bleeding (blood soaking through dressing) or unexpected drainage from the wound.  Extreme redness or swelling around the incision site, drainage of pus or foul smelling drainage.  Inability to urinate or empty your bladder within 8 hours.  Problems with breathing or chest pain.    You should call 911 if you develop problems with breathing or chest pain.  If you are unable to contact your doctor or surgical center, you should go to the nearest emergency room or urgent care center.      Physician's telephone #: [phone]    If any questions arise, call your doctor.  If your doctor is not available, please feel free to call the Surgical Center at [phone]. The Center is open Monday through Friday from 7AM to 7PM. You can also call  the HEALTH HOTLINE open 24 hours/day, 7 days/week and speak to a nurse at [phone], or toll free at [phone].    A registered nurse may call you a few days after your surgery to see how you are doing after your procedure.    MEDICATIONS: Resume taking daily medication.  Take prescribed pain medication with food.  If no medication is prescribed, you may take non-aspirin pain medication if needed. PAIN MEDICATION CAN BE VERY CONSTIPATING. Take a stool softener or laxative such as senokot, pericolace, or milk of magnesia if needed.    Prescription given for Tramadol (sent to pharmacy).     Last pain medication given at 2:25 pm (650 mg Tylenol), 2:42 pm (2.5 mg oxycodone) & 3:15 pm (2.5 mg oxycodone).    If your physician has prescribed pain medication that includes Acetaminophen (Tylenol), do not take additional Acetaminophen (Tylenol) while taking the prescribed medication.    Depression / Suicide Risk    As you are discharged from this [facility] facility, it is important to learn how to keep safe from harming yourself.    Recognize the warning signs:  · Abrupt changes in personality, positive or negative- including increase in energy   · Giving away possessions  · Change in eating patterns- significant weight changes-  positive or negative  · Change in sleeping patterns- unable to sleep or sleeping all the time   · Unwillingness or inability to communicate  · Depression  · Unusual sadness, discouragement and loneliness  · Talk of wanting to die  · Neglect of personal appearance   · Rebelliousness- reckless behavior  · Withdrawal from people/activities they love  · Confusion- inability to concentrate     If you or a loved one observes any of these behaviors or has concerns about self-harm, here's what you can do:  · Talk about it- your feelings and reasons for harming yourself  · Remove any means that you might use to hurt yourself (examples: pills, rope, extension cords, firearm)  · Get professional  help from the community (Mental Health, Substance Abuse, psychological counseling)  · Do not be alone:Call your Safe Contact- someone whom you trust who will be there for you.  · Call your local CRISIS HOTLINE 771-8622 or 762-680-8063  · Call your local Children's Mobile Crisis Response Team [place] (280) 804-3186 or www.Pawngo  · Call the toll free National Suicide Prevention Hotlines   · National Suicide Prevention Lifeline 655-485-OIAH (0339)  · National Hope Line Network 800-SUICIDE (917-8574)

## 2020-12-29 NOTE — OR NURSING
1412 To PACU from OR via pepper s/p right thumb ligament repair. Pt awake, reports 5/10 tolerable pain to her right thumb. Pt denies nausea. Surgical dressings to right hand. Fingers to the affected extremity are pink and warm, brisk cap refill observed.     1425 Tylenol administered as requested by anesthesiologist.    1440 Pt reports 8/10 pain. Small dose of narcotic pain medication administered as requested by the pt. Pt provided with crackers.    1455 No changes.    1505 Pt reports 7/10 pain. Pt's  updated.    1520 Pt opts to take another small dose of oral pain medication.    1535 Pt reports 5/10 tolerable pain.    1540 Report to discharge AUGUST Silva.

## 2021-02-16 ENCOUNTER — HOSPITAL ENCOUNTER (OUTPATIENT)
Dept: LAB | Facility: MEDICAL CENTER | Age: 66
End: 2021-02-16
Attending: INTERNAL MEDICINE
Payer: COMMERCIAL

## 2021-02-16 LAB
ALBUMIN SERPL BCP-MCNC: 4.2 G/DL (ref 3.2–4.9)
BUN SERPL-MCNC: 18 MG/DL (ref 8–22)
CALCIUM SERPL-MCNC: 9.6 MG/DL (ref 8.5–10.5)
CHLORIDE SERPL-SCNC: 105 MMOL/L (ref 96–112)
CO2 SERPL-SCNC: 24 MMOL/L (ref 20–33)
CREAT SERPL-MCNC: 0.93 MG/DL (ref 0.5–1.4)
GLUCOSE SERPL-MCNC: 88 MG/DL (ref 65–99)
PHOSPHATE SERPL-MCNC: 4.3 MG/DL (ref 2.5–4.5)
POTASSIUM SERPL-SCNC: 4.3 MMOL/L (ref 3.6–5.5)
SODIUM SERPL-SCNC: 141 MMOL/L (ref 135–145)
T4 FREE SERPL-MCNC: 0.77 NG/DL (ref 0.93–1.7)
TSH SERPL DL<=0.005 MIU/L-ACNC: 0.41 UIU/ML (ref 0.38–5.33)

## 2021-02-16 PROCEDURE — 36415 COLL VENOUS BLD VENIPUNCTURE: CPT

## 2021-02-16 PROCEDURE — 84443 ASSAY THYROID STIM HORMONE: CPT

## 2021-02-16 PROCEDURE — 80069 RENAL FUNCTION PANEL: CPT

## 2021-02-16 PROCEDURE — 84439 ASSAY OF FREE THYROXINE: CPT

## 2021-05-17 ENCOUNTER — HOSPITAL ENCOUNTER (OUTPATIENT)
Dept: LAB | Facility: MEDICAL CENTER | Age: 66
End: 2021-05-17
Attending: INTERNAL MEDICINE
Payer: COMMERCIAL

## 2021-05-17 LAB
ALBUMIN SERPL BCP-MCNC: 4.5 G/DL (ref 3.2–4.9)
BUN SERPL-MCNC: 14 MG/DL (ref 8–22)
CALCIUM SERPL-MCNC: 9.4 MG/DL (ref 8.5–10.5)
CHLORIDE SERPL-SCNC: 104 MMOL/L (ref 96–112)
CO2 SERPL-SCNC: 23 MMOL/L (ref 20–33)
CREAT SERPL-MCNC: 1.26 MG/DL (ref 0.5–1.4)
GLUCOSE SERPL-MCNC: 98 MG/DL (ref 65–99)
PHOSPHATE SERPL-MCNC: 3.3 MG/DL (ref 2.5–4.5)
POTASSIUM SERPL-SCNC: 3.8 MMOL/L (ref 3.6–5.5)
SODIUM SERPL-SCNC: 141 MMOL/L (ref 135–145)
T4 FREE SERPL-MCNC: 0.53 NG/DL (ref 0.93–1.7)
TSH SERPL DL<=0.005 MIU/L-ACNC: 3.94 UIU/ML (ref 0.38–5.33)

## 2021-05-17 PROCEDURE — 84439 ASSAY OF FREE THYROXINE: CPT

## 2021-05-17 PROCEDURE — 80069 RENAL FUNCTION PANEL: CPT

## 2021-05-17 PROCEDURE — 84443 ASSAY THYROID STIM HORMONE: CPT

## 2021-05-17 PROCEDURE — 86800 THYROGLOBULIN ANTIBODY: CPT

## 2021-05-17 PROCEDURE — 36415 COLL VENOUS BLD VENIPUNCTURE: CPT

## 2021-05-17 PROCEDURE — 82043 UR ALBUMIN QUANTITATIVE: CPT

## 2021-05-17 PROCEDURE — 84432 ASSAY OF THYROGLOBULIN: CPT

## 2021-05-17 PROCEDURE — 82570 ASSAY OF URINE CREATININE: CPT

## 2021-05-18 LAB
CREAT UR-MCNC: 61.17 MG/DL
MICROALBUMIN UR-MCNC: <1.2 MG/DL
MICROALBUMIN/CREAT UR: NORMAL MG/G (ref 0–30)

## 2021-05-19 LAB
THYROGLOB AB SERPL-ACNC: <0.9 IU/ML (ref 0–4)
THYROGLOB SERPL-MCNC: <0.1 NG/ML (ref 1.3–31.8)
THYROGLOB SERPL-MCNC: ABNORMAL NG/ML (ref 1.3–31.8)

## 2021-08-27 ENCOUNTER — HOSPITAL ENCOUNTER (OUTPATIENT)
Dept: LAB | Facility: MEDICAL CENTER | Age: 66
End: 2021-08-27
Attending: INTERNAL MEDICINE
Payer: COMMERCIAL

## 2021-08-27 LAB
ALBUMIN SERPL BCP-MCNC: 4.2 G/DL (ref 3.2–4.9)
BUN SERPL-MCNC: 10 MG/DL (ref 8–22)
CALCIUM SERPL-MCNC: 9.1 MG/DL (ref 8.5–10.5)
CHLORIDE SERPL-SCNC: 106 MMOL/L (ref 96–112)
CO2 SERPL-SCNC: 23 MMOL/L (ref 20–33)
CREAT SERPL-MCNC: 0.98 MG/DL (ref 0.5–1.4)
GLUCOSE SERPL-MCNC: 93 MG/DL (ref 65–99)
PHOSPHATE SERPL-MCNC: 3.7 MG/DL (ref 2.5–4.5)
POTASSIUM SERPL-SCNC: 4.1 MMOL/L (ref 3.6–5.5)
SODIUM SERPL-SCNC: 141 MMOL/L (ref 135–145)

## 2021-08-27 PROCEDURE — 80069 RENAL FUNCTION PANEL: CPT

## 2021-08-27 PROCEDURE — 36415 COLL VENOUS BLD VENIPUNCTURE: CPT

## 2021-09-08 ENCOUNTER — HOSPITAL ENCOUNTER (OUTPATIENT)
Dept: LAB | Facility: MEDICAL CENTER | Age: 66
End: 2021-09-08
Attending: INTERNAL MEDICINE
Payer: MEDICARE

## 2021-09-08 PROCEDURE — 80069 RENAL FUNCTION PANEL: CPT

## 2021-09-08 PROCEDURE — 36415 COLL VENOUS BLD VENIPUNCTURE: CPT

## 2021-09-09 LAB
ALBUMIN SERPL BCP-MCNC: 4.2 G/DL (ref 3.2–4.9)
BUN SERPL-MCNC: 11 MG/DL (ref 8–22)
CALCIUM SERPL-MCNC: 9.5 MG/DL (ref 8.5–10.5)
CHLORIDE SERPL-SCNC: 101 MMOL/L (ref 96–112)
CO2 SERPL-SCNC: 26 MMOL/L (ref 20–33)
CREAT SERPL-MCNC: 0.98 MG/DL (ref 0.5–1.4)
GLUCOSE SERPL-MCNC: 88 MG/DL (ref 65–99)
PHOSPHATE SERPL-MCNC: 4.4 MG/DL (ref 2.5–4.5)
POTASSIUM SERPL-SCNC: 4.3 MMOL/L (ref 3.6–5.5)
SODIUM SERPL-SCNC: 137 MMOL/L (ref 135–145)

## 2021-09-20 ENCOUNTER — HOSPITAL ENCOUNTER (OUTPATIENT)
Dept: LAB | Facility: MEDICAL CENTER | Age: 66
End: 2021-09-20
Attending: INTERNAL MEDICINE
Payer: MEDICARE

## 2021-09-20 LAB
ALBUMIN SERPL BCP-MCNC: 4.5 G/DL (ref 3.2–4.9)
BUN SERPL-MCNC: 19 MG/DL (ref 8–22)
CALCIUM SERPL-MCNC: 9.4 MG/DL (ref 8.5–10.5)
CHLORIDE SERPL-SCNC: 99 MMOL/L (ref 96–112)
CO2 SERPL-SCNC: 26 MMOL/L (ref 20–33)
CREAT SERPL-MCNC: 1.05 MG/DL (ref 0.5–1.4)
GLUCOSE SERPL-MCNC: 84 MG/DL (ref 65–99)
PHOSPHATE SERPL-MCNC: 4.5 MG/DL (ref 2.5–4.5)
POTASSIUM SERPL-SCNC: 4.4 MMOL/L (ref 3.6–5.5)
SODIUM SERPL-SCNC: 137 MMOL/L (ref 135–145)

## 2021-09-20 PROCEDURE — 80069 RENAL FUNCTION PANEL: CPT

## 2021-09-20 PROCEDURE — 36415 COLL VENOUS BLD VENIPUNCTURE: CPT

## 2021-10-06 ENCOUNTER — HOSPITAL ENCOUNTER (OUTPATIENT)
Facility: MEDICAL CENTER | Age: 66
End: 2021-10-06
Attending: INTERNAL MEDICINE
Payer: MEDICARE

## 2021-10-06 LAB
ALBUMIN SERPL BCP-MCNC: 4.3 G/DL (ref 3.2–4.9)
BUN SERPL-MCNC: 17 MG/DL (ref 8–22)
CALCIUM SERPL-MCNC: 9.3 MG/DL (ref 8.5–10.5)
CHLORIDE SERPL-SCNC: 103 MMOL/L (ref 96–112)
CO2 SERPL-SCNC: 22 MMOL/L (ref 20–33)
CREAT SERPL-MCNC: 1.09 MG/DL (ref 0.5–1.4)
GLUCOSE SERPL-MCNC: 72 MG/DL (ref 65–99)
PHOSPHATE SERPL-MCNC: 4.3 MG/DL (ref 2.5–4.5)
POTASSIUM SERPL-SCNC: 4.3 MMOL/L (ref 3.6–5.5)
SODIUM SERPL-SCNC: 138 MMOL/L (ref 135–145)

## 2021-10-06 PROCEDURE — 80069 RENAL FUNCTION PANEL: CPT

## 2021-10-06 PROCEDURE — 36415 COLL VENOUS BLD VENIPUNCTURE: CPT

## 2021-10-14 ENCOUNTER — HOSPITAL ENCOUNTER (OUTPATIENT)
Dept: LAB | Facility: MEDICAL CENTER | Age: 66
End: 2021-10-14
Attending: INTERNAL MEDICINE
Payer: MEDICARE

## 2021-10-14 LAB
ALBUMIN SERPL BCP-MCNC: 4.6 G/DL (ref 3.2–4.9)
CALCIUM SERPL-MCNC: 10.4 MG/DL (ref 8.5–10.5)

## 2021-10-14 PROCEDURE — 82040 ASSAY OF SERUM ALBUMIN: CPT

## 2021-10-14 PROCEDURE — 36415 COLL VENOUS BLD VENIPUNCTURE: CPT

## 2021-10-14 PROCEDURE — 82310 ASSAY OF CALCIUM: CPT

## 2021-10-18 ENCOUNTER — HOSPITAL ENCOUNTER (OUTPATIENT)
Dept: LAB | Facility: MEDICAL CENTER | Age: 66
End: 2021-10-18
Attending: INTERNAL MEDICINE
Payer: MEDICARE

## 2021-10-18 LAB
ALBUMIN SERPL BCP-MCNC: 4.4 G/DL (ref 3.2–4.9)
CALCIUM SERPL-MCNC: 10.5 MG/DL (ref 8.5–10.5)

## 2021-10-18 PROCEDURE — 82040 ASSAY OF SERUM ALBUMIN: CPT

## 2021-10-18 PROCEDURE — 82310 ASSAY OF CALCIUM: CPT

## 2021-10-18 PROCEDURE — 36415 COLL VENOUS BLD VENIPUNCTURE: CPT

## 2021-10-25 ENCOUNTER — HOSPITAL ENCOUNTER (OUTPATIENT)
Dept: LAB | Facility: MEDICAL CENTER | Age: 66
End: 2021-10-25
Attending: INTERNAL MEDICINE
Payer: MEDICARE

## 2021-10-25 LAB
ALBUMIN SERPL BCP-MCNC: 4.5 G/DL (ref 3.2–4.9)
BUN SERPL-MCNC: 12 MG/DL (ref 8–22)
CALCIUM SERPL-MCNC: 10.9 MG/DL (ref 8.5–10.5)
CHLORIDE SERPL-SCNC: 109 MMOL/L (ref 96–112)
CO2 SERPL-SCNC: 25 MMOL/L (ref 20–33)
CREAT SERPL-MCNC: 0.98 MG/DL (ref 0.5–1.4)
GLUCOSE SERPL-MCNC: 90 MG/DL (ref 65–99)
PHOSPHATE SERPL-MCNC: 3.5 MG/DL (ref 2.5–4.5)
POTASSIUM SERPL-SCNC: 4.6 MMOL/L (ref 3.6–5.5)
SODIUM SERPL-SCNC: 147 MMOL/L (ref 135–145)

## 2021-10-25 PROCEDURE — 36415 COLL VENOUS BLD VENIPUNCTURE: CPT

## 2021-10-25 PROCEDURE — 80069 RENAL FUNCTION PANEL: CPT

## 2021-11-03 ENCOUNTER — HOSPITAL ENCOUNTER (OUTPATIENT)
Dept: LAB | Facility: MEDICAL CENTER | Age: 66
End: 2021-11-03
Attending: INTERNAL MEDICINE
Payer: MEDICARE

## 2021-11-03 LAB
ALBUMIN SERPL BCP-MCNC: 4.4 G/DL (ref 3.2–4.9)
CALCIUM SERPL-MCNC: 11.5 MG/DL (ref 8.5–10.5)

## 2021-11-03 PROCEDURE — 36415 COLL VENOUS BLD VENIPUNCTURE: CPT

## 2021-11-03 PROCEDURE — 82310 ASSAY OF CALCIUM: CPT

## 2021-11-03 PROCEDURE — 82040 ASSAY OF SERUM ALBUMIN: CPT

## 2022-10-04 ENCOUNTER — APPOINTMENT (RX ONLY)
Dept: URBAN - METROPOLITAN AREA CLINIC 31 | Facility: CLINIC | Age: 67
Setting detail: DERMATOLOGY
End: 2022-10-04

## 2022-10-04 DIAGNOSIS — L57.0 ACTINIC KERATOSIS: ICD-10-CM

## 2022-10-04 DIAGNOSIS — L08.9 LOCAL INFECTION OF THE SKIN AND SUBCUTANEOUS TISSUE, UNSPECIFIED: ICD-10-CM

## 2022-10-04 PROCEDURE — ? PRESCRIPTION

## 2022-10-04 PROCEDURE — ? LIQUID NITROGEN

## 2022-10-04 PROCEDURE — 99202 OFFICE O/P NEW SF 15 MIN: CPT | Mod: 25

## 2022-10-04 PROCEDURE — ? COUNSELING

## 2022-10-04 PROCEDURE — 17000 DESTRUCT PREMALG LESION: CPT

## 2022-10-04 RX ORDER — GENTAMICIN SULFATE 1 MG/G
OINTMENT TOPICAL
Qty: 30 | Refills: 0 | Status: ERX | COMMUNITY
Start: 2022-10-04

## 2022-10-04 RX ADMIN — GENTAMICIN SULFATE: 1 OINTMENT TOPICAL at 00:00

## 2022-10-04 ASSESSMENT — LOCATION SIMPLE DESCRIPTION DERM
LOCATION SIMPLE: LEFT LIP
LOCATION SIMPLE: LEFT CALF

## 2022-10-04 ASSESSMENT — LOCATION DETAILED DESCRIPTION DERM
LOCATION DETAILED: LEFT PROXIMAL CALF
LOCATION DETAILED: LEFT INFERIOR VERMILION LIP

## 2022-10-04 ASSESSMENT — LOCATION ZONE DERM
LOCATION ZONE: LEG
LOCATION ZONE: LIP

## 2022-10-04 NOTE — PROCEDURE: MIPS QUALITY
Quality 130: Documentation Of Current Medications In The Medical Record: Current Medications Documented
Detail Level: Detailed
Quality 110: Preventive Care And Screening: Influenza Immunization: Influenza immunization was not ordered or administered, reason not given
Quality 431: Preventive Care And Screening: Unhealthy Alcohol Use - Screening: Patient not identified as an unhealthy alcohol user when screened for unhealthy alcohol use using a systematic screening method
Quality 226: Preventive Care And Screening: Tobacco Use: Screening And Cessation Intervention: Patient screened for tobacco use and is an ex/non-smoker
Quality 111:Pneumonia Vaccination Status For Older Adults: Pneumococcal vaccine (PPSV23) was not administered on or after patient’s 60th birthday and before the end of the measurement period, reason not otherwise specified

## 2023-09-24 DIAGNOSIS — R94.4 DECREASED GFR: ICD-10-CM

## 2023-09-25 ENCOUNTER — HOSPITAL ENCOUNTER (OUTPATIENT)
Dept: RADIOLOGY | Facility: MEDICAL CENTER | Age: 68
End: 2023-09-25
Payer: MEDICARE

## 2023-09-25 DIAGNOSIS — R94.4 DECREASED GFR: ICD-10-CM

## 2023-09-25 PROCEDURE — 93975 VASCULAR STUDY: CPT

## 2024-05-16 DIAGNOSIS — M54.2 NECK PAIN: ICD-10-CM

## 2024-05-22 ENCOUNTER — OFFICE VISIT (OUTPATIENT)
Dept: PHYSICAL MEDICINE AND REHAB | Facility: MEDICAL CENTER | Age: 69
End: 2024-05-22
Payer: MEDICARE

## 2024-05-22 ENCOUNTER — HOSPITAL ENCOUNTER (OUTPATIENT)
Dept: RADIOLOGY | Facility: MEDICAL CENTER | Age: 69
End: 2024-05-22

## 2024-05-22 VITALS
DIASTOLIC BLOOD PRESSURE: 68 MMHG | HEIGHT: 62 IN | BODY MASS INDEX: 27.05 KG/M2 | HEART RATE: 86 BPM | OXYGEN SATURATION: 97 % | TEMPERATURE: 97.4 F | SYSTOLIC BLOOD PRESSURE: 118 MMHG | WEIGHT: 147 LBS

## 2024-05-22 DIAGNOSIS — M47.812 CERVICAL SPONDYLOSIS: ICD-10-CM

## 2024-05-22 DIAGNOSIS — M47.812 FACET ARTHRITIS OF CERVICAL REGION: ICD-10-CM

## 2024-05-22 DIAGNOSIS — M54.2 NECK PAIN: ICD-10-CM

## 2024-05-22 DIAGNOSIS — Z71.82 EXERCISE COUNSELING: ICD-10-CM

## 2024-05-22 DIAGNOSIS — M43.12 SPONDYLOLISTHESIS, CERVICAL REGION: ICD-10-CM

## 2024-05-22 PROCEDURE — 99204 OFFICE O/P NEW MOD 45 MIN: CPT | Performed by: GENERAL PRACTICE

## 2024-05-22 PROCEDURE — 3078F DIAST BP <80 MM HG: CPT | Performed by: GENERAL PRACTICE

## 2024-05-22 PROCEDURE — 1125F AMNT PAIN NOTED PAIN PRSNT: CPT | Performed by: GENERAL PRACTICE

## 2024-05-22 PROCEDURE — 3074F SYST BP LT 130 MM HG: CPT | Performed by: GENERAL PRACTICE

## 2024-05-22 RX ORDER — METHOCARBAMOL 500 MG/1
500 TABLET, FILM COATED ORAL
COMMUNITY

## 2024-05-22 ASSESSMENT — PATIENT HEALTH QUESTIONNAIRE - PHQ9: CLINICAL INTERPRETATION OF PHQ2 SCORE: 0

## 2024-05-22 ASSESSMENT — PAIN SCALES - GENERAL: PAINLEVEL: 4=SLIGHT-MODERATE PAIN

## 2024-05-22 NOTE — PROGRESS NOTES
Physiatry (Physical Medicine and  Rehabilitation)       Patient Name: Lisa Yang   Patient : 1955  PCP: Rosibel Gonzalez M.D.  MRN: [id number]     Date of service: See epic    Chief complaint:   Chief Complaint   Patient presents with    [facility]     Neck Pain        Referring provider: Khushi Jones A.P.R.*    HISTORY    Lisa Yang is a RIGHT hand dominant 68 y.o. very pleasant female  who presents with a referral for   Chief Complaint   Patient presents with    [facility]     Neck Pain          Medical records review:  I reviewed the note and imaging from the referring provider Khushi Jones A.P.R.* including the note dated 24.  -provided robaxin    Prior Procedures:   none    HPI:    2024 acute on chronic neck pain. Bilateral pain near occiput and radiates over scalp. Onset 1.5 years ago but worsening in the last 4 weeks.  Described as sharp and achiness.  Pain level currently 3-4 out of 10 but initially 8-9 out of 10.  In the mornings pain is 6-7 out of 10, 2-3 out of 10 in the afternoon and evening and 4-5 out of 10 at bedtime.  Worse with bending backward and sidebending twisting and laying down. Headache. Has woken up twice with hand numbness. Currently working with a .   No recent injury, falls, or trauma.  No associated symptoms:  No radiation of pain down BUE, progressive weakness, or coordination issues.  The patient  denies any fevers, chills, chest pain or shortness of breath. Affecting ADLs. Robaxin effective and APAP only as not tolerate nsaids.  Currently working with the .  Participated in physical therapy last year for 4 weeks.  Evaluated by [facility] orthopedics    ROS:   Red Flags ROS:   Fever, Chills, Sweats: Denies  Involuntary Weight Loss: Denies  See HPI.   All other systems reviewed and negative.         GOALS OF TREATMENT: Symptom/Pain relief. improve function.        PMHx:   Past Medical History:   Diagnosis Date    Arthritis      "osteoarthritis    Asthma     \"not an issue since 2000\"    Cancer (HCC)     Thyroid    Heart murmur     \"not an issue\"    High cholesterol     \"but good ratio\"    Hypoparathyroidism (HCC)     Hypothyroidism, postsurgical 1998    Papillary carcinoma of thyroid (HCC) 1998    R  < 1 cm / no mets / no invasion    papillary carcinoma thyroid Mar 1998    I-131 therapy 29 mCi    papillary carcinoma thyroid Nov 1998    I-131 therapy 29 mCi / + uptake bed    papillary carcinoma thyroid 1999    Thyrogen I-131 scan neg / Tg = 0    papillary carcinoma thyroid 2003    MRI soft tissues neck = neg    papillary carcinoma thyroid 2005    CT scan soft tissues neck = neg    Renal disorder     chronic kidney disease stage 2    S/P cholecystectomy 2006    S/P hysterectomy with oophorectomy        PSHx:   Past Surgical History:   Procedure Laterality Date    LIGAMENT REPAIR Right 12/29/2020    Procedure: REPAIR, LIGAMENT - ULNAR COLLAGERAL THUMB;  Surgeon: Farhad Plata M.D.;  Location: SURGERY [facility];  Service: Orthopedics    CHOLECYSTECTOMY  2007    laparoscopic    HYSTERECTOMY, VAGINAL  1994    GYN LAPAROSCOPY  1992    APPENDECTOMY  1966    THYROIDECTOMY      TONSILLECTOMY  as a child       Family history   No family history on file.    Medications: reviewed on epic.   Outpatient Medications Marked as Taking for the 5/22/24 encounter (Office Visit) with Stefan Delong D.O.   Medication Sig Dispense Refill    methocarbamol (ROBAXIN) 500 MG Tab Take 500 mg by mouth 2 times a day.      Calcium Citrate-Vitamin D (CALCIUM CITRATE + PO) Take 200 mg by mouth every day.      Multiple Vitamins-Minerals (WOMENS DAILY FORMULA PO) every day.      acetaminophen (TYLENOL) 325 MG Tab Take 650 mg by mouth every four hours as needed.          Allergies:   Allergies   Allergen Reactions    Other Environmental      seasonal    Codeine Vomiting       Social Hx:   Social History     Socioeconomic History    Marital status:      Spouse name: " "Not on file    Number of children: Not on file    Years of education: Not on file    Highest education level: Not on file   Occupational History    Not on file   Tobacco Use    Smoking status: Never    Smokeless tobacco: Never   Vaping Use    Vaping status: Never Used   Substance and Sexual Activity    Alcohol use: Yes     Comment: 4 per week    Drug use: No    Sexual activity: Not on file   Other Topics Concern    Not on file   Social History Narrative    Not on file     Social Determinants of Health     Financial Resource Strain: Not on file   Food Insecurity: Not on file   Transportation Needs: Not on file   Physical Activity: Not on file   Stress: Not on file   Social Connections: Not on file   Intimate Partner Violence: Not on file   Housing Stability: Not on file         EXAMINATION   Vitals: /68 (BP Location: Left arm, Patient Position: Sitting, BP Cuff Size: Large adult)   Pulse 86   Temp 36.3 °C (97.4 °F) (Temporal)   Ht 1.575 m (5' 2\")   Wt 66.7 kg (147 lb)   SpO2 97%   Physical Exam:     Body Habitus: Body mass index is 26.89 kg/m².  Appearance: Well-groomed, well-nourished, not disheveled  Eyes: No scleral icterus to suggest severe liver disease, no proptosis to suggest severe hyperthyroid  ENT -no obvious auditory deficits, no external lesions, moist mucus membranes   Skin -no rashes or lesions noted. No appreciable skin breakdown on exposed skin areas.    Respiratory-  breathing comfortably on room air, no audible wheezing, full sentences  Cardiovascular- No lower extremity edema noted.   Psychiatric- alert and oriented, calm, comfortable, cooperative     Musculoskeletal and Neuro:  Gait and station - normal gait with reciprocal pattern,  no presence/use of ambulatory device, no arm assistance with sit-to-stand, nonantalgic. no loss of balance during exam.  No change in patient's demeanor with exam.    Grossly normal cranial nerve exam  Coordination grossly intact     Cervical spine " "  Inspection: No deformities of the skin over the cervical spine. No rashes or lesions.  full   active range of motion in all directions, with  pain    Spurling’s sign: negative bilaterally  No signs of muscular atrophy in bilateral upper extremities   No tenderness to palpation of the cervical spine  tenderness to palpation on the BILATERAL side in the cervical facets and greater occipital nerves right worse than left.    tenderness of paraspinal muscles including bilateral trapezius muscles  Key points for the international standards for neurological classification of spinal cord injury (ISNCSCI) to light touch.   Dermatome R L   C4 2 2   C5 2 2   C6 2 2   C7 2 2   C8 2 2   T1 2 2   T2 2 2     Nerve: neurovascularly intact radial, median, ulnar, and AIN     Motor Exam Upper Extremities   ? Myotome R L   Shoulder flexion C5 5 5   Elbow flexion C5 5 5   Wrist extension C6 5 5   Elbow extension C7 5 5   Finger flexion C8 5 5   Finger abduction T1 5 5     Reflexes  ?  R L   Biceps  2+ 2+   Brachioradialis  2+ 2+   Mcconnell’s sign negative bilaterally     MEDICAL DECISION MAKING    Medical records review: see under HPI section.     DATA    Labs:   Lab Results   Component Value Date/Time    SODIUM 147 (H) 10/25/2021 09:29 AM    POTASSIUM 4.6 10/25/2021 09:29 AM    CHLORIDE 109 10/25/2021 09:29 AM    CO2 25 10/25/2021 09:29 AM    ANION 12.0 12/07/2020 01:18 PM    GLUCOSE 90 10/25/2021 09:29 AM    BUN 12 10/25/2021 09:29 AM    CREATININE 0.98 10/25/2021 09:29 AM    CREATININE 1.00 07/03/2012 10:41 AM    CALCIUM 11.5 (H) 11/03/2021 07:47 AM    ASTSGOT 15 12/07/2020 01:18 PM    ALTSGPT 15 12/07/2020 01:18 PM    TBILIRUBIN 0.3 12/07/2020 01:18 PM    ALBUMIN 4.4 11/03/2021 07:47 AM    TOTPROTEIN 7.5 12/07/2020 01:18 PM    GLOBULIN 3.0 12/07/2020 01:18 PM    AGRATIO 1.5 12/07/2020 01:18 PM   ]    No results found for: \"PROTHROMBTM\", \"INR\"     Lab Results   Component Value Date/Time    WBC 7.2 12/07/2020 01:18 PM    RBC 4.50 " "05/02/2022 03:33 PM    RBC 4.63 12/07/2020 01:18 PM    HEMOGLOBIN 13.4 05/02/2022 03:33 PM    HEMOGLOBIN 13.8 12/07/2020 01:18 PM    HEMATOCRIT 38.9 05/02/2022 03:33 PM    HEMATOCRIT 41.3 12/07/2020 01:18 PM    MCV 86.3 05/02/2022 03:33 PM    MCV 89.2 12/07/2020 01:18 PM    MCH 29.8 05/02/2022 03:33 PM    MCH 29.8 12/07/2020 01:18 PM    MCHC 34.6 05/02/2022 03:33 PM    MCHC 33.4 (L) 12/07/2020 01:18 PM    MPV 9.1 05/02/2022 03:33 PM    MPV 11.5 12/07/2020 01:18 PM    NEUTSPOLYS 51.2 05/02/2022 03:33 PM    NEUTSPOLYS 56.90 12/07/2020 01:18 PM    LYMPHOCYTES 32.9 05/02/2022 03:33 PM    LYMPHOCYTES 29.50 12/07/2020 01:18 PM    MONOCYTES 9.8 05/02/2022 03:33 PM    MONOCYTES 6.90 12/07/2020 01:18 PM    EOSINOPHILS 5.5 05/02/2022 03:33 PM    EOSINOPHILS 5.40 12/07/2020 01:18 PM    BASOPHILS 0.6 05/02/2022 03:33 PM    BASOPHILS 1.00 12/07/2020 01:18 PM        No results found for: \"HBA1C\"     Imaging:   I personally reviewed following images, these are my reads  Cervical x-ray 4/26/2024: Degenerative changes present, retrolisthesis of C4 on C3.  Facet arthropathy present, straightening of cervical lordosis, no significant changes with flexion and extension    IMAGING radiology reads. I reviewed the following radiology reads            DX-CERVICAL SPINE-4+ VIEWS  Order: [id number]  Impression      1.  Multilevel spondylosis with stable subtle anterolisthesis C3-4,  retrolisthesis C4-5 and C5-6.    Electronically Signed by: Jak Rae MD 4/26/2024 4:10 PM  Narrative    EXAMINATION:  XR CERVICAL SPINE 4 OR MORE VIEWS    REASON FOR EXAM: CERVICALGIA    COMPARISON:  No relevant studies available for comparison.    COMMENTS:  Alignment: Subtle anterolisthesis C3-4 and retrolisthesis at C4-5 and C5-6  without dynamic instability.  Compression fracture: No compression fracture identified.  Degenerative changes: Moderate to severe degenerative disc space narrowing with  small endplate bone spurs demonstrated at C4-5 and to a " lesser extent C5-6 and  C6-7.  Mild diffuse uncovertebral and facet joint hypertrophy. No significant  facet and uncovertebral arthropathy.  Other: Paravertebral soft tissues unremarkabl                                                 DX-CERVICAL SPINE-2 VIEWS  Order: [id number]  Impression      1.  Mild to moderate cervical spine degenerative changes.    Electronically Signed by: VI SNEED MD 2023 8:35 AM  Narrative    EXAMINATION:  XR CERVICAL SPINE 2 TO 3 VIEWS    REASON FOR EXAM: Cervicalgia    COMPARISON:  No relevant studies available for comparison.    COMMENTS:  Alignment: 3 mm retrolisthesis of C4 and C5.  Compression fracture: No compression fracture identified.  Degenerative changes: Moderate disc height loss and endplate degenerative  changes at C4-C5. Mild disc height loss and endplate degenerative changes at  C5-C6. Mild multilevel facet and uncovertebral arthropathy.  Other: Unremarkable.                                                    ASSESSMENT AND PLAN:  Lisa Yang   : 1955   Past medical history of thyroid cancer and hypoparathyroidism    Diagnosis   Visit Diagnoses     ICD-10-CM   1. Neck pain  M54.2   2. Spondylolisthesis, cervical region  M43.12   3. Cervical spondylosis  M47.812   4. Dietary counseling  Z71.3   5. Exercise counseling  Z71.82       Patient with historical and clinical evidence consistent with facet arthropathy supported by x-rays and clinical exam with painful extension.  Patient had advanced imaging completed at's with orthopedics and awaiting imaging to review.  Patient has had physical therapy in the past.  Onset of pain about 1-1/2 years ago.  Current pain level is 4/10.  Encourage patient to continue using Robaxin which has been effective and Tylenol.  Differential also includes occipital neuralgia bilaterally and myalgia.  thyroid cancer and hypoparathyroidism  exercising counseling discussed.  Extensive discussion regarding treatment options, at  this time recommending the following:    Orders Placed This Encounter    Referral to Physical Therapy    methocarbamol (ROBAXIN) 500 MG Tab         PLAN  -Medications/Modalities:   Previously prescribed medications  -Limitations:    Activity as tolerated  -Brace/orthotic/assistive devices: Not indicated at this time  -Therapy (PT/OT/Aquatherapy): ordered to focus on strengthening and stretching    -Home exercise program: encouraged and provided home exercises of regular strengthening and stretching.    -Office Injections: will consider at a later time  -Diagnostic workup: reviewed today as above  -Interventional program: I would consider the patient for  injection depending on the results of the above   -Referrals: PT   -Outside records requested:  The patient signed Outside Records Request Form for her outside records including images. This includes the [facility] from [facility]    Follow-up:     Return to clinic in 12 weeks for follow-up of symptomatology}, or sooner as needed for any acute issues.  Patient expressed understanding of the management plan. Patient (and Family Members) was/were encouraged to call if any worries, issues, problems or concerns prior to the next visit     Please note that this dictation was created using voice recognition software. I have made every reasonable attempt to correct obvious errors but there may be errors of grammar and content that I may have overlooked prior to finalization of this note.      Stefan Delong, DO  Physical Medicine and Rehabilitation  [facility]         CC Rosibel Gonzalez M.D.   CC Khushi Jones A.P.R.*

## 2024-05-29 ENCOUNTER — HOSPITAL ENCOUNTER (OUTPATIENT)
Dept: RADIOLOGY | Facility: MEDICAL CENTER | Age: 69
End: 2024-05-29

## 2024-08-22 ENCOUNTER — APPOINTMENT (OUTPATIENT)
Dept: PHYSICAL MEDICINE AND REHAB | Facility: MEDICAL CENTER | Age: 69
End: 2024-08-22
Payer: MEDICARE

## 2024-10-21 DIAGNOSIS — B02.9 HERPES ZOSTER WITHOUT COMPLICATION: ICD-10-CM

## 2024-10-21 RX ORDER — PREDNISONE 20 MG/1
20 TABLET ORAL 2 TIMES DAILY
Qty: 10 TABLET | Refills: 0 | Status: SHIPPED | OUTPATIENT
Start: 2024-10-21 | End: 2024-10-26

## (undated) DEVICE — GLOVE, LITE (PAIR)

## (undated) DEVICE — ELECTRODE 850 FOAM ADHESIVE - HYDROGEL RADIOTRNSPRNT (50/PK)

## (undated) DEVICE — SUTURE 2-0 VICRYL PLUS SH - 27 INCH (36/BX)

## (undated) DEVICE — HEAD HOLDER JUNIOR/ADULT

## (undated) DEVICE — CANISTER SUCTION RIGID RED 1500CC (40EA/CA)

## (undated) DEVICE — SUTURE GENERAL

## (undated) DEVICE — GLOVE BIOGEL INDICATOR SZ 8 SURGICAL PF LTX - (50/BX 4BX/CA)

## (undated) DEVICE — STOCKINETTE IMPERVIOUS 12X48 - STERILELF (10/CA)"

## (undated) DEVICE — SET EXTENSION WITH 2 PORTS (48EA/CA) ***PART #2C8610 IS A SUBSTITUTE*****

## (undated) DEVICE — PROTECTOR ULNA NERVE - (36PR/CA)

## (undated) DEVICE — MASK ANESTHESIA ADULT  - (100/CA)

## (undated) DEVICE — CATHETER IV 20 GA X 1-1/4 ---SURG.& SDS ONLY--- (50EA/BX)

## (undated) DEVICE — SUTURE 4-0 ETHILON FS-1 18 (36PK/BX)"

## (undated) DEVICE — KIT ANESTHESIA W/CIRCUIT & 3/LT BAG W/FILTER (20EA/CA)

## (undated) DEVICE — NEPTUNE 4 PORT MANIFOLD - (20/PK)

## (undated) DEVICE — GLOVE BIOGEL PI INDICATOR SZ 8.5 SURGICAL PF LF - (50PR/BX 4BX/CA)

## (undated) DEVICE — HUMID-VENT HEAT AND MOISTURE EXCHANGE- (50/BX)

## (undated) DEVICE — TUBING CLEARLINK DUO-VENT - C-FLO (48EA/CA)

## (undated) DEVICE — BAG SPONGE COUNT 10.25 X 32 - BLUE (250/CA)

## (undated) DEVICE — BANDAGE ELASTIC 3 X 5 YDS - STERILE VELCRO (25/CA)LATEX

## (undated) DEVICE — STYLETTE 6FR INFANT STERILE SINGEL ALUMINUM SUNSLIP SEALED IN PVC SHEATH (20EA/BX)

## (undated) DEVICE — PACK UPPER EXTREMITY SM OR - (3/CA)

## (undated) DEVICE — GUIDE TRACHE TUBE INTUBATING STYLET 5.0-10.0MM 14FR (20EA/PK)

## (undated) DEVICE — CAST PADDING 3 X 4 YDS

## (undated) DEVICE — TUBE E-T HI-LO CUFF 7.5MM (10EA/PK)

## (undated) DEVICE — SENSOR SPO2 NEO LNCS ADHESIVE (20/BX) SEE USER NOTES

## (undated) DEVICE — BLADE SURGICAL #15 - (50/BX 3BX/CA)

## (undated) DEVICE — PENCIL ELECTSURG 10FT BTN SWH - (50/CA)

## (undated) DEVICE — SYRINGE 10 ML CONTROL LL (25EA/BX 4BX/CA)

## (undated) DEVICE — CANISTER SUCTION 3000ML MECHANICAL FILTER AUTO SHUTOFF MEDI-VAC NONSTERILE LF DISP  (40EA/CA)

## (undated) DEVICE — SUCTION INSTRUMENT YANKAUER BULBOUS TIP W/O VENT (50EA/CA)